# Patient Record
Sex: MALE | Race: OTHER | NOT HISPANIC OR LATINO | URBAN - METROPOLITAN AREA
[De-identification: names, ages, dates, MRNs, and addresses within clinical notes are randomized per-mention and may not be internally consistent; named-entity substitution may affect disease eponyms.]

---

## 2017-03-02 ENCOUNTER — OUTPATIENT (OUTPATIENT)
Dept: OUTPATIENT SERVICES | Facility: HOSPITAL | Age: 22
LOS: 1 days | Discharge: HOME | End: 2017-03-02

## 2017-06-27 DIAGNOSIS — R29.91 UNSPECIFIED SYMPTOMS AND SIGNS INVOLVING THE MUSCULOSKELETAL SYSTEM: ICD-10-CM

## 2019-03-09 ENCOUNTER — OFFICE VISIT (OUTPATIENT)
Dept: URGENT CARE | Facility: URGENT CARE | Age: 24
End: 2019-03-09
Payer: COMMERCIAL

## 2019-03-09 VITALS
OXYGEN SATURATION: 97 % | SYSTOLIC BLOOD PRESSURE: 120 MMHG | WEIGHT: 240 LBS | BODY MASS INDEX: 38.57 KG/M2 | HEIGHT: 66 IN | DIASTOLIC BLOOD PRESSURE: 82 MMHG | HEART RATE: 84 BPM

## 2019-03-09 DIAGNOSIS — J01.90 ACUTE SINUSITIS WITH SYMPTOMS > 10 DAYS: Primary | ICD-10-CM

## 2019-03-09 PROCEDURE — 99203 OFFICE O/P NEW LOW 30 MIN: CPT | Performed by: INTERNAL MEDICINE

## 2019-03-09 RX ORDER — MULTIVIT-MIN/IRON/FOLIC ACID/K 18-600-40
CAPSULE ORAL AT BEDTIME
COMMUNITY

## 2019-03-09 RX ORDER — ESCITALOPRAM OXALATE 10 MG/1
10 TABLET ORAL AT BEDTIME
COMMUNITY

## 2019-03-09 ASSESSMENT — MIFFLIN-ST. JEOR: SCORE: 2031.38

## 2019-03-09 ASSESSMENT — ENCOUNTER SYMPTOMS: FEVER: 0

## 2019-03-09 NOTE — PROGRESS NOTES
"SUBJECTIVE:   Amilcar Cope is a 23 year old male presenting with a chief complaint of   Chief Complaint   Patient presents with     Urgent Care     URI     sick x 3 weeks, headache, joint pain, yellow gunk. h/o immune compromized.        He is a new patient of [facility].    URI Adult    Onset of symptoms was 3 week(s) ago.  Course of illness is waxing and waning.      Current and Associated symptoms: green mucous from nose, sore throat, facial pain/pressure, headache and body aches  Treatment measures tried include OTC Cough med.  Predisposing factors include ill contact: roommate.        Review of Systems   Constitutional: Negative for fever.       Past Medical History:   Diagnosis Date     Anxiety      Asthma     child     Depression      Hypogammaglobulinemia (H)     sinus infections     Family History   Problem Relation Age of Onset     Immunodeficiency Father         as child     Current Outpatient Medications   Medication Sig Dispense Refill     amoxicillin-clavulanate (AUGMENTIN) 875-125 MG tablet Take 1 tablet by mouth 2 times daily for 10 days 20 tablet 0     Cholecalciferol (VITAMIN D) 2000 units CAPS        escitalopram (LEXAPRO) 10 MG tablet Take 10 mg by mouth daily       Testosterone Cypionate 50 MG/ML SOLN        Social History     Tobacco Use     Smoking status: Never Smoker     Smokeless tobacco: Never Used   Substance Use Topics     Alcohol use: Not on file       OBJECTIVE  /82   Pulse 84   Ht 1.676 m (5' 6\")   Wt 108.9 kg (240 lb)   SpO2 97%   BMI 38.74 kg/m      Physical Exam   Constitutional: He appears well-developed and well-nourished.   fatigue   HENT:   tm nl b  bilateral sinus tenderness  pnd   Eyes: Conjunctivae are normal.   Cardiovascular: Normal rate, regular rhythm, normal heart sounds and intact distal pulses.   Pulmonary/Chest: Effort normal and breath sounds normal.   Vitals reviewed.      Labs:  No results found for this or any previous visit (from the past 24 " hour(s)).        ASSESSMENT:      ICD-10-CM    1. Acute sinusitis with symptoms > 10 days J01.90 amoxicillin-clavulanate (AUGMENTIN) 875-125 MG tablet        Serious Comorbid Conditions:  Adult:  immunosuppression    PLAN:    URI Adult:  RX sinusitis  Augmentin 875 bid x 10 days    Followup:    If not improving or if condition worsens, follow up with your Primary Care Provider

## 2019-04-23 ENCOUNTER — PATIENT OUTREACH (OUTPATIENT)
Dept: PLASTIC SURGERY | Facility: CLINIC | Age: 24
End: 2019-04-23

## 2019-04-23 DIAGNOSIS — F64.0 GENDER DYSPHORIA IN ADOLESCENT AND ADULT: Primary | ICD-10-CM

## 2019-04-23 NOTE — PROGRESS NOTES
[facility]:  Care Coordination Note     SITUATION   Patient (Amilcar, He/him) is a 23 year old who is receiving support for:  Clinic Care Coordination - Initial (new patient referral for top & hysterectomy)  .    BACKGROUND     New patient referred by Dr. Rodas at [facility], requesting top surgery and hysterectomy.      ASSESSMENT     Surgery              [facility] Assessment  Comprehensive [facility] (CGC) Enrollment: Enrolled([facility] 8/2017 with Dr. Rodas at [facility])  Patient has a therapist: No(Starting with New Therapist soon)  Letter of support #1: Requested  Letter of support #2: Requested  Surgery being considered: Yes  Mastectomy: Yes          PLAN          Nursing Interventions:  [facility] program and services discussed with patient. CGC referral placed. CGC assessment completed. Process for accessing surgery discussed including: WPATH standards of care, Letters of support, [place] insurance process, surgery scheduling, and approximate timeline. Pt questions answered. Registration completed & reviewed; scheduling process discussed & completed, as necessary.     More than 50% of the time was used to educate patient on medical & surgical process.     Follow-up plan:  Pt to work with new therapist to obtain one letter of support. Pt informed about needing 2 Letters of support for hysterectomy. Pt scheduled for plastic surgery appt. IB Msg sent to [facility] to call pt to schedule for hysterectomy consultation.        Severo Reagan

## 2019-05-03 ENCOUNTER — HEALTH MAINTENANCE LETTER (OUTPATIENT)
Age: 24
End: 2019-05-03

## 2019-05-28 ASSESSMENT — ANXIETY QUESTIONNAIRES
5. BEING SO RESTLESS THAT IT IS HARD TO SIT STILL: MORE THAN HALF THE DAYS
GAD7 TOTAL SCORE: 11
4. TROUBLE RELAXING: SEVERAL DAYS
7. FEELING AFRAID AS IF SOMETHING AWFUL MIGHT HAPPEN: SEVERAL DAYS
3. WORRYING TOO MUCH ABOUT DIFFERENT THINGS: MORE THAN HALF THE DAYS
2. NOT BEING ABLE TO STOP OR CONTROL WORRYING: MORE THAN HALF THE DAYS
1. FEELING NERVOUS, ANXIOUS, OR ON EDGE: SEVERAL DAYS
6. BECOMING EASILY ANNOYED OR IRRITABLE: MORE THAN HALF THE DAYS
GAD7 TOTAL SCORE: 11
7. FEELING AFRAID AS IF SOMETHING AWFUL MIGHT HAPPEN: SEVERAL DAYS

## 2019-05-28 ASSESSMENT — ENCOUNTER SYMPTOMS
MYALGIAS: 1
NECK PAIN: 1
ARTHRALGIAS: 0
NAIL CHANGES: 0
PANIC: 1
DECREASED CONCENTRATION: 1
POOR WOUND HEALING: 0
NERVOUS/ANXIOUS: 1
DEPRESSION: 1
INSOMNIA: 1
SKIN CHANGES: 0
MUSCLE WEAKNESS: 0
BACK PAIN: 0
JOINT SWELLING: 0
STIFFNESS: 1
MUSCLE CRAMPS: 0

## 2019-05-29 ASSESSMENT — ANXIETY QUESTIONNAIRES: GAD7 TOTAL SCORE: 11

## 2019-05-30 ENCOUNTER — OFFICE VISIT (OUTPATIENT)
Dept: OBGYN | Facility: CLINIC | Age: 24
End: 2019-05-30
Attending: PLASTIC SURGERY
Payer: COMMERCIAL

## 2019-05-30 VITALS
SYSTOLIC BLOOD PRESSURE: 116 MMHG | HEIGHT: 66 IN | BODY MASS INDEX: 38.41 KG/M2 | HEART RATE: 55 BPM | WEIGHT: 239 LBS | DIASTOLIC BLOOD PRESSURE: 77 MMHG

## 2019-05-30 DIAGNOSIS — F64.0 GENDER DYSPHORIA IN ADULT: Primary | ICD-10-CM

## 2019-05-30 ASSESSMENT — PAIN SCALES - GENERAL: PAINLEVEL: NO PAIN (0)

## 2019-05-30 ASSESSMENT — MIFFLIN-ST. JEOR: SCORE: 2026.85

## 2019-05-30 NOTE — LETTER
5/30/2019       RE: Amilcar Cope  [address] [facility] [id number]     Dear Colleague,    Thank you for referring your patient, Amilcar Cope, to the [facility] at [facility]. Please see a copy of my visit note below.    Gynecology visit Note  5/30/19    Reason for visit: Desires hysterectomy    HPI: Wiley Cope is a 24 y/o female to male transgender patient who presents today for discussion of possible hysterectomy with BSO. He is currently taking testosterone injections since August 2018. He does have occasional spotting that has no predictable schedule and ~one day of bleeding. He has associated lower abdominal pain/cramping that has been debilitating in the past. He's had a Mirena IUD inserted since 2016 and this has helped his bleeding slightly. He has no interest in fertility sparing options. He has a history of hypogammaglobulinemia and in his childhood and had many infections and required long term antibiotics as a child. Since that time, he's had chronic sinus infections but they have cleared after starting Zyrtec.     OB/GYN History:  Nulligravid  Menses as per HPI  Sexually active with male partners  Denies STI history, declines STI screening  Pap smear UTD  Mirena IUD for contraception as well as menstrual suppression    PMH:  Past Medical History:   Diagnosis Date     Anxiety      Asthma     child     Depression      Hypogammaglobulinemia (H)     sinus infections     PSHx:  [place] Teeth    Medications:  Current Outpatient Medications   Medication     Cholecalciferol (VITAMIN D) 2000 units CAPS     escitalopram (LEXAPRO) 10 MG tablet     Testosterone Cypionate 50 MG/ML SOLN     Allergies   Allergen Reactions     Sulfa Drugs      Social History     Tobacco Use     Smoking status: Never Smoker     Smokeless tobacco: Never Used   Substance Use Topics     Alcohol use: Not Currently     Drug use: Never     Family History   Problem Relation  "Age of Onset     Immunodeficiency Father         as child     Coronary Artery Disease Father      Hypertension Father      Anxiety Disorder Father      Diabetes Father      Genetic Disorder Father         Autoimmune     Obesity Father      Asthma Father      Anesthesia Reaction Father         Issues waking up     Breast Cancer Paternal Grandfather      Coronary Artery Disease Paternal Grandfather      Hypertension Paternal Grandfather      Anxiety Disorder Paternal Grandfather      Asthma Paternal Grandfather      Uterine Cancer Paternal Grandmother      Other Cancer Paternal Grandmother      Anxiety Disorder Paternal Grandmother      Substance Abuse Paternal Grandmother      Other Cancer Maternal Grandfather      Coronary Artery Disease Maternal Grandfather      Hypertension Maternal Grandfather      Anxiety Disorder Maternal Grandfather      Substance Abuse Maternal Grandfather      Coronary Artery Disease Mother      Anxiety Disorder Mother      Obesity Mother      Asthma Mother         Acid reflux induced     Anesthesia Reaction Mother         Gets violently ill     Coronary Artery Disease Maternal Grandmother      Anxiety Disorder Maternal Grandmother      Diabetes Maternal Grandmother      Obesity Maternal Grandmother    No bleeding/clotting disorders    Physical Exam:   Vitals:    05/30/19 0819   BP: 116/77   Pulse: 55   Weight: 108.4 kg (239 lb)   Height: 1.676 m (5' 6\")      General: NAD, A&Ox3  Resp: Non-labored breathing    Imaging Pelvic US 12/7/2018:  Measurements and comments  Uterus:  Longitudinal AP Transverse   6.8x3.4x4.2 cm   Position:  anteverted  Comments:  symmetrical    Cervix and lower uterine segment are: Normal  Myometrium: homogeneous    Endometrium: 1.7 mm, IUD is seen in appropriate position in endometrial   cavity    Right ovary:   Longitudinal AP Transverse   2.2 1.5 1.3 cm   Comments: appears normal    Left ovary:  Longitudinal AP Transverse   1.4 2.0 2.8 cm   Comments: appears " normal    Adnexa:  no masses seen    Peritoneal fluid: absent    Assessment/Plan: Wiley Cope is a 22 y/o female to male transgender patient who presents today in consultation for a possible hysterectomy with BSO.    #Gender Confirmation Surgery:    - Surgical options including open, laparoscopic, and robotic assisted surgery all discussed. We discussed hysterectomy with or without removal of ovaries and fallopian tubes.  He would like to proceed with a total hysterectomy with BSO. Risks and benefits of hysterectomy with BSO discussed with patient including bleeding, infection, damage to surrounding structures including bladder, bowel, ureters, muscles, nerves, vessels.  We also discussed risk of regret.  He understands this is a sterilization procedure and signed the consent form today acknowledging this. He declines referral for consideration of egg harvesting pre-surgery.  He expresses no interest in fertility sparing options.  He is aware he needs 2 letters of support prior to the operation. We discussed expected recovery time, easier with minimally invasive surgery, but 6 weeks recovery either way with pelvic and lifting restrictions x 6 weeks.  We discussed the rare likelihood of malignancy given his age and recent ultrasound but that if final pathology showed any concerns for this, he may require further surgery and he understands this.  He is considering open vs robotic assisted and wants to speak with the surgical scheduler prior to deciding as timing of surgery seems to be a more priority than type of surgery.  Will need pre-operative visit within 30 days of his scheduled surgery date, he understands this.    [place], MS4    Pt seen and discussed with Dr. Cuellar.     Physician Attestation     I, Phylicia Cuellar, was present with the medical student who participated in the service and in the documentation of the note.  I have verified the history and personally performed the physical exam and medical  decision making.  I agree with the assessment and plan of care as documented in the note.       Phylicia Cuellar MD

## 2019-05-30 NOTE — NURSING NOTE
Chief Complaint   Patient presents with     Consult     Hysterectomy consult   Maribeth Mccall LPN

## 2019-05-30 NOTE — PROGRESS NOTES
Gynecology visit Note  5/30/19    Reason for visit: Desires hysterectomy    HPI: Wiley Cope is a 22 y/o female to male transgender patient who presents today for discussion of possible hysterectomy with BSO. He is currently taking testosterone injections since August 2018. He does have occasional spotting that has no predictable schedule and ~one day of bleeding. He has associated lower abdominal pain/cramping that has been debilitating in the past. He's had a Mirena IUD inserted since 2016 and this has helped his bleeding slightly. He has no interest in fertility sparing options. He has a history of hypogammaglobulinemia and in his childhood and had many infections and required long term antibiotics as a child. Since that time, he's had chronic sinus infections but they have cleared after starting Zyrtec.     OB/GYN History:  Nulligravid  Menses as per HPI  Sexually active with male partners  Denies STI history, declines STI screening  Pap smear UTD  Mirena IUD for contraception as well as menstrual suppression    PMH:  Past Medical History:   Diagnosis Date     Anxiety      Asthma     child     Depression      Hypogammaglobulinemia (H)     sinus infections     PSHx:  [place] Teeth    Medications:  Current Outpatient Medications   Medication     Cholecalciferol (VITAMIN D) 2000 units CAPS     escitalopram (LEXAPRO) 10 MG tablet     Testosterone Cypionate 50 MG/ML SOLN     Allergies   Allergen Reactions     Sulfa Drugs      Social History     Tobacco Use     Smoking status: Never Smoker     Smokeless tobacco: Never Used   Substance Use Topics     Alcohol use: Not Currently     Drug use: Never     Family History   Problem Relation Age of Onset     Immunodeficiency Father         as child     Coronary Artery Disease Father      Hypertension Father      Anxiety Disorder Father      Diabetes Father      Genetic Disorder Father         Autoimmune     Obesity Father      Asthma Father      Anesthesia Reaction Father         " Issues waking up     Breast Cancer Paternal Grandfather      Coronary Artery Disease Paternal Grandfather      Hypertension Paternal Grandfather      Anxiety Disorder Paternal Grandfather      Asthma Paternal Grandfather      Uterine Cancer Paternal Grandmother      Other Cancer Paternal Grandmother      Anxiety Disorder Paternal Grandmother      Substance Abuse Paternal Grandmother      Other Cancer Maternal Grandfather      Coronary Artery Disease Maternal Grandfather      Hypertension Maternal Grandfather      Anxiety Disorder Maternal Grandfather      Substance Abuse Maternal Grandfather      Coronary Artery Disease Mother      Anxiety Disorder Mother      Obesity Mother      Asthma Mother         Acid reflux induced     Anesthesia Reaction Mother         Gets violently ill     Coronary Artery Disease Maternal Grandmother      Anxiety Disorder Maternal Grandmother      Diabetes Maternal Grandmother      Obesity Maternal Grandmother    No bleeding/clotting disorders    Physical Exam:   Vitals:    05/30/19 0819   BP: 116/77   Pulse: 55   Weight: 108.4 kg (239 lb)   Height: 1.676 m (5' 6\")      General: NAD, A&Ox3  Resp: Non-labored breathing    Imaging Pelvic US 12/7/2018:  Measurements and comments  Uterus:  Longitudinal AP Transverse   6.8x3.4x4.2 cm   Position:  anteverted  Comments:  symmetrical    Cervix and lower uterine segment are: Normal  Myometrium: homogeneous    Endometrium: 1.7 mm, IUD is seen in appropriate position in endometrial   cavity    Right ovary:   Longitudinal AP Transverse   2.2 1.5 1.3 cm   Comments: appears normal    Left ovary:  Longitudinal AP Transverse   1.4 2.0 2.8 cm   Comments: appears normal    Adnexa:  no masses seen    Peritoneal fluid: absent    Assessment/Plan: Wiley Cope is a 22 y/o female to male transgender patient who presents today in consultation for a possible hysterectomy with BSO.    #Gender Confirmation Surgery:    - Surgical options including open, laparoscopic, " and robotic assisted surgery all discussed. We discussed hysterectomy with or without removal of ovaries and fallopian tubes.  He would like to proceed with a total hysterectomy with BSO. Risks and benefits of hysterectomy with BSO discussed with patient including bleeding, infection, damage to surrounding structures including bladder, bowel, ureters, muscles, nerves, vessels.  We also discussed risk of regret.  He understands this is a sterilization procedure and signed the consent form today acknowledging this. He declines referral for consideration of egg harvesting pre-surgery.  He expresses no interest in fertility sparing options.  He is aware he needs 2 letters of support prior to the operation. We discussed expected recovery time, easier with minimally invasive surgery, but 6 weeks recovery either way with pelvic and lifting restrictions x 6 weeks.  We discussed the rare likelihood of malignancy given his age and recent ultrasound but that if final pathology showed any concerns for this, he may require further surgery and he understands this.  He is considering open vs robotic assisted and wants to speak with the surgical scheduler prior to deciding as timing of surgery seems to be a more priority than type of surgery.  Will need pre-operative visit within 30 days of his scheduled surgery date, he understands this.    [place], MS4    Pt seen and discussed with Dr. Cuellar.     Physician Attestation     I, Phylicia Cuellar, was present with the medical student who participated in the service and in the documentation of the note.  I have verified the history and personally performed the physical exam and medical decision making.  I agree with the assessment and plan of care as documented in the note.       Phylicia Cuellar MD

## 2019-06-03 DIAGNOSIS — F64.9 GENDER DYSPHORIA: Primary | ICD-10-CM

## 2019-06-03 RX ORDER — CEFAZOLIN SODIUM 2 G/100ML
2 INJECTION, SOLUTION INTRAVENOUS
Status: CANCELLED | OUTPATIENT
Start: 2019-06-03

## 2019-06-03 RX ORDER — PHENAZOPYRIDINE HYDROCHLORIDE 100 MG/1
200 TABLET, FILM COATED ORAL ONCE
Status: CANCELLED | OUTPATIENT
Start: 2019-06-03 | End: 2019-06-03

## 2019-06-03 RX ORDER — CEFAZOLIN SODIUM 1 G/3ML
1 INJECTION, POWDER, FOR SOLUTION INTRAMUSCULAR; INTRAVENOUS SEE ADMIN INSTRUCTIONS
Status: CANCELLED | OUTPATIENT
Start: 2019-06-03

## 2019-09-10 ENCOUNTER — TRANSFERRED RECORDS (OUTPATIENT)
Dept: HEALTH INFORMATION MANAGEMENT | Facility: CLINIC | Age: 24
End: 2019-09-10

## 2019-09-26 ENCOUNTER — PREP FOR PROCEDURE (OUTPATIENT)
Dept: OBGYN | Facility: CLINIC | Age: 24
End: 2019-09-26

## 2019-09-26 ENCOUNTER — ANESTHESIA EVENT (OUTPATIENT)
Dept: SURGERY | Facility: CLINIC | Age: 24
End: 2019-09-26
Payer: COMMERCIAL

## 2019-09-27 ENCOUNTER — ANESTHESIA (OUTPATIENT)
Dept: SURGERY | Facility: CLINIC | Age: 24
End: 2019-09-27
Payer: COMMERCIAL

## 2019-09-27 ENCOUNTER — HOSPITAL ENCOUNTER (OUTPATIENT)
Facility: CLINIC | Age: 24
Discharge: HOME | End: 2019-09-27
Attending: OBSTETRICS & GYNECOLOGY | Admitting: OBSTETRICS & GYNECOLOGY
Payer: COMMERCIAL

## 2019-09-27 VITALS
TEMPERATURE: 98.2 F | DIASTOLIC BLOOD PRESSURE: 67 MMHG | RESPIRATION RATE: 15 BRPM | WEIGHT: 233.47 LBS | BODY MASS INDEX: 37.52 KG/M2 | OXYGEN SATURATION: 97 % | HEART RATE: 80 BPM | HEIGHT: 66 IN | SYSTOLIC BLOOD PRESSURE: 121 MMHG

## 2019-09-27 DIAGNOSIS — F64.9 GENDER DYSPHORIA: ICD-10-CM

## 2019-09-27 LAB
ABO + RH BLD: NORMAL
ABO + RH BLD: NORMAL
ALBUMIN SERPL-MCNC: 4.1 G/DL (ref 3.4–5)
ALP SERPL-CCNC: 71 U/L (ref 40–150)
ALT SERPL W P-5'-P-CCNC: 23 U/L (ref 0–70)
ANION GAP SERPL CALCULATED.3IONS-SCNC: 9 MMOL/L (ref 3–14)
AST SERPL W P-5'-P-CCNC: 21 U/L (ref 0–45)
B-HCG SERPL-ACNC: <1 IU/L
BILIRUB SERPL-MCNC: 0.8 MG/DL (ref 0.2–1.3)
BLD GP AB SCN SERPL QL: NORMAL
BLOOD BANK CMNT PATIENT-IMP: NORMAL
BUN SERPL-MCNC: 18 MG/DL (ref 7–30)
CALCIUM SERPL-MCNC: 9 MG/DL (ref 8.5–10.1)
CHLORIDE SERPL-SCNC: 103 MMOL/L (ref 94–109)
CO2 SERPL-SCNC: 25 MMOL/L (ref 20–32)
CREAT SERPL-MCNC: 1.11 MG/DL (ref 0.66–1.25)
ERYTHROCYTE [DISTWIDTH] IN BLOOD BY AUTOMATED COUNT: 12.6 % (ref 10–15)
GFR SERPL CREATININE-BSD FRML MDRD: >90 ML/MIN/{1.73_M2}
GLUCOSE SERPL-MCNC: 82 MG/DL (ref 70–99)
HCT VFR BLD AUTO: 53.2 % (ref 40–53)
HGB BLD-MCNC: 18.3 G/DL (ref 13.3–17.7)
MCH RBC QN AUTO: 31 PG (ref 26.5–33)
MCHC RBC AUTO-ENTMCNC: 34.4 G/DL (ref 31.5–36.5)
MCV RBC AUTO: 90 FL (ref 78–100)
PLATELET # BLD AUTO: 276 10E9/L (ref 150–450)
POTASSIUM SERPL-SCNC: 3.6 MMOL/L (ref 3.4–5.3)
PROT SERPL-MCNC: 7.4 G/DL (ref 6.8–8.8)
RBC # BLD AUTO: 5.9 10E12/L (ref 4.4–5.9)
SODIUM SERPL-SCNC: 137 MMOL/L (ref 133–144)
SPECIMEN EXP DATE BLD: NORMAL
WBC # BLD AUTO: 12.1 10E9/L (ref 4–11)

## 2019-09-27 PROCEDURE — 37000008 ZZH ANESTHESIA TECHNICAL FEE, 1ST 30 MIN: Performed by: OBSTETRICS & GYNECOLOGY

## 2019-09-27 PROCEDURE — 25000132 ZZH RX MED GY IP 250 OP 250 PS 637: Performed by: OBSTETRICS & GYNECOLOGY

## 2019-09-27 PROCEDURE — 25800030 ZZH RX IP 258 OP 636: Performed by: NURSE ANESTHETIST, CERTIFIED REGISTERED

## 2019-09-27 PROCEDURE — 85027 COMPLETE CBC AUTOMATED: CPT | Performed by: OBSTETRICS & GYNECOLOGY

## 2019-09-27 PROCEDURE — 37000009 ZZH ANESTHESIA TECHNICAL FEE, EACH ADDTL 15 MIN: Performed by: OBSTETRICS & GYNECOLOGY

## 2019-09-27 PROCEDURE — 80053 COMPREHEN METABOLIC PANEL: CPT | Performed by: OBSTETRICS & GYNECOLOGY

## 2019-09-27 PROCEDURE — C1765 ADHESION BARRIER: HCPCS | Performed by: OBSTETRICS & GYNECOLOGY

## 2019-09-27 PROCEDURE — 86850 RBC ANTIBODY SCREEN: CPT | Performed by: OBSTETRICS & GYNECOLOGY

## 2019-09-27 PROCEDURE — 36000086 ZZH SURGERY LEVEL 8 1ST 30 MIN UMMC: Performed by: OBSTETRICS & GYNECOLOGY

## 2019-09-27 PROCEDURE — 88307 TISSUE EXAM BY PATHOLOGIST: CPT | Performed by: OBSTETRICS & GYNECOLOGY

## 2019-09-27 PROCEDURE — 25000128 H RX IP 250 OP 636

## 2019-09-27 PROCEDURE — 88307 TISSUE EXAM BY PATHOLOGIST: CPT | Mod: 26 | Performed by: OBSTETRICS & GYNECOLOGY

## 2019-09-27 PROCEDURE — 86901 BLOOD TYPING SEROLOGIC RH(D): CPT | Performed by: OBSTETRICS & GYNECOLOGY

## 2019-09-27 PROCEDURE — 25000125 ZZHC RX 250: Performed by: NURSE ANESTHETIST, CERTIFIED REGISTERED

## 2019-09-27 PROCEDURE — 27210794 ZZH OR GENERAL SUPPLY STERILE: Performed by: OBSTETRICS & GYNECOLOGY

## 2019-09-27 PROCEDURE — 25000128 H RX IP 250 OP 636: Performed by: NURSE ANESTHETIST, CERTIFIED REGISTERED

## 2019-09-27 PROCEDURE — 36000088 ZZH SURGERY LEVEL 8 EA 15 ADDTL MIN - UMMC: Performed by: OBSTETRICS & GYNECOLOGY

## 2019-09-27 PROCEDURE — 71000027 ZZH RECOVERY PHASE 2 EACH 15 MINS: Performed by: OBSTETRICS & GYNECOLOGY

## 2019-09-27 PROCEDURE — 71000014 ZZH RECOVERY PHASE 1 LEVEL 2 FIRST HR: Performed by: OBSTETRICS & GYNECOLOGY

## 2019-09-27 PROCEDURE — 84702 CHORIONIC GONADOTROPIN TEST: CPT | Performed by: OBSTETRICS & GYNECOLOGY

## 2019-09-27 PROCEDURE — 86900 BLOOD TYPING SEROLOGIC ABO: CPT | Performed by: OBSTETRICS & GYNECOLOGY

## 2019-09-27 PROCEDURE — 36415 COLL VENOUS BLD VENIPUNCTURE: CPT | Performed by: OBSTETRICS & GYNECOLOGY

## 2019-09-27 PROCEDURE — 40000170 ZZH STATISTIC PRE-PROCEDURE ASSESSMENT II: Performed by: OBSTETRICS & GYNECOLOGY

## 2019-09-27 PROCEDURE — 71000015 ZZH RECOVERY PHASE 1 LEVEL 2 EA ADDTL HR: Performed by: OBSTETRICS & GYNECOLOGY

## 2019-09-27 PROCEDURE — 25000131 ZZH RX MED GY IP 250 OP 636 PS 637: Performed by: STUDENT IN AN ORGANIZED HEALTH CARE EDUCATION/TRAINING PROGRAM

## 2019-09-27 PROCEDURE — 25800030 ZZH RX IP 258 OP 636

## 2019-09-27 PROCEDURE — 25000566 ZZH SEVOFLURANE, EA 15 MIN: Performed by: OBSTETRICS & GYNECOLOGY

## 2019-09-27 PROCEDURE — 25000128 H RX IP 250 OP 636: Performed by: OBSTETRICS & GYNECOLOGY

## 2019-09-27 RX ORDER — HYDROMORPHONE HYDROCHLORIDE 1 MG/ML
.3-.5 INJECTION, SOLUTION INTRAMUSCULAR; INTRAVENOUS; SUBCUTANEOUS EVERY 5 MIN PRN
Status: DISCONTINUED | OUTPATIENT
Start: 2019-09-27 | End: 2019-09-27 | Stop reason: HOSPADM

## 2019-09-27 RX ORDER — ONDANSETRON 2 MG/ML
INJECTION INTRAMUSCULAR; INTRAVENOUS PRN
Status: DISCONTINUED | OUTPATIENT
Start: 2019-09-27 | End: 2019-09-27

## 2019-09-27 RX ORDER — SODIUM CHLORIDE, SODIUM LACTATE, POTASSIUM CHLORIDE, CALCIUM CHLORIDE 600; 310; 30; 20 MG/100ML; MG/100ML; MG/100ML; MG/100ML
INJECTION, SOLUTION INTRAVENOUS CONTINUOUS
Status: DISCONTINUED | OUTPATIENT
Start: 2019-09-27 | End: 2019-09-27 | Stop reason: HOSPADM

## 2019-09-27 RX ORDER — DIPHENHYDRAMINE HCL 50 MG
50 CAPSULE ORAL AT BEDTIME
COMMUNITY

## 2019-09-27 RX ORDER — GABAPENTIN 100 MG/1
300 CAPSULE ORAL ONCE
Status: DISCONTINUED | OUTPATIENT
Start: 2019-09-27 | End: 2019-09-27 | Stop reason: HOSPADM

## 2019-09-27 RX ORDER — LIDOCAINE 40 MG/G
CREAM TOPICAL
Status: DISCONTINUED | OUTPATIENT
Start: 2019-09-27 | End: 2019-09-27 | Stop reason: HOSPADM

## 2019-09-27 RX ORDER — DEXAMETHASONE SODIUM PHOSPHATE 4 MG/ML
INJECTION, SOLUTION INTRA-ARTICULAR; INTRALESIONAL; INTRAMUSCULAR; INTRAVENOUS; SOFT TISSUE PRN
Status: DISCONTINUED | OUTPATIENT
Start: 2019-09-27 | End: 2019-09-27

## 2019-09-27 RX ORDER — PHENAZOPYRIDINE HYDROCHLORIDE 200 MG/1
200 TABLET, FILM COATED ORAL ONCE
Status: COMPLETED | OUTPATIENT
Start: 2019-09-27 | End: 2019-09-27

## 2019-09-27 RX ORDER — ACETAMINOPHEN 325 MG/1
650 TABLET ORAL
Status: DISCONTINUED | OUTPATIENT
Start: 2019-09-27 | End: 2019-09-27 | Stop reason: HOSPADM

## 2019-09-27 RX ORDER — FENTANYL CITRATE 50 UG/ML
25-50 INJECTION, SOLUTION INTRAMUSCULAR; INTRAVENOUS
Status: DISCONTINUED | OUTPATIENT
Start: 2019-09-27 | End: 2019-09-27 | Stop reason: HOSPADM

## 2019-09-27 RX ORDER — CEFAZOLIN SODIUM 2 G/100ML
2 INJECTION, SOLUTION INTRAVENOUS
Status: COMPLETED | OUTPATIENT
Start: 2019-09-27 | End: 2019-09-27

## 2019-09-27 RX ORDER — PROPOFOL 10 MG/ML
INJECTION, EMULSION INTRAVENOUS PRN
Status: DISCONTINUED | OUTPATIENT
Start: 2019-09-27 | End: 2019-09-27

## 2019-09-27 RX ORDER — ONDANSETRON 4 MG/1
4 TABLET, ORALLY DISINTEGRATING ORAL
Status: COMPLETED | OUTPATIENT
Start: 2019-09-27 | End: 2019-09-27

## 2019-09-27 RX ORDER — ONDANSETRON 2 MG/ML
4 INJECTION INTRAMUSCULAR; INTRAVENOUS EVERY 30 MIN PRN
Status: DISCONTINUED | OUTPATIENT
Start: 2019-09-27 | End: 2019-09-27 | Stop reason: HOSPADM

## 2019-09-27 RX ORDER — NALOXONE HYDROCHLORIDE 0.4 MG/ML
.1-.4 INJECTION, SOLUTION INTRAMUSCULAR; INTRAVENOUS; SUBCUTANEOUS
Status: DISCONTINUED | OUTPATIENT
Start: 2019-09-27 | End: 2019-09-27 | Stop reason: HOSPADM

## 2019-09-27 RX ORDER — OXYCODONE HYDROCHLORIDE 5 MG/1
5-10 TABLET ORAL EVERY 4 HOURS PRN
Qty: 10 TABLET | Refills: 0 | Status: SHIPPED | OUTPATIENT
Start: 2019-09-27 | End: 2019-11-12

## 2019-09-27 RX ORDER — FENTANYL CITRATE 50 UG/ML
INJECTION, SOLUTION INTRAMUSCULAR; INTRAVENOUS PRN
Status: DISCONTINUED | OUTPATIENT
Start: 2019-09-27 | End: 2019-09-27

## 2019-09-27 RX ORDER — OXYCODONE HYDROCHLORIDE 5 MG/1
5 TABLET ORAL
Status: DISCONTINUED | OUTPATIENT
Start: 2019-09-27 | End: 2019-09-27 | Stop reason: HOSPADM

## 2019-09-27 RX ORDER — CEFAZOLIN SODIUM 1 G/3ML
1 INJECTION, POWDER, FOR SOLUTION INTRAMUSCULAR; INTRAVENOUS SEE ADMIN INSTRUCTIONS
Status: DISCONTINUED | OUTPATIENT
Start: 2019-09-27 | End: 2019-09-27 | Stop reason: HOSPADM

## 2019-09-27 RX ORDER — CETIRIZINE HYDROCHLORIDE 10 MG/1
10 TABLET ORAL AT BEDTIME
COMMUNITY

## 2019-09-27 RX ORDER — AMOXICILLIN 250 MG
1-2 CAPSULE ORAL 2 TIMES DAILY
Qty: 30 TABLET | Refills: 0 | Status: SHIPPED | OUTPATIENT
Start: 2019-09-27 | End: 2019-11-12

## 2019-09-27 RX ORDER — LIDOCAINE HYDROCHLORIDE 20 MG/ML
INJECTION, SOLUTION INFILTRATION; PERINEURAL PRN
Status: DISCONTINUED | OUTPATIENT
Start: 2019-09-27 | End: 2019-09-27

## 2019-09-27 RX ORDER — FLUMAZENIL 0.1 MG/ML
0.2 INJECTION, SOLUTION INTRAVENOUS
Status: DISCONTINUED | OUTPATIENT
Start: 2019-09-27 | End: 2019-09-27 | Stop reason: HOSPADM

## 2019-09-27 RX ORDER — ONDANSETRON 4 MG/1
4 TABLET, ORALLY DISINTEGRATING ORAL EVERY 30 MIN PRN
Status: DISCONTINUED | OUTPATIENT
Start: 2019-09-27 | End: 2019-09-27 | Stop reason: HOSPADM

## 2019-09-27 RX ORDER — ACETAMINOPHEN 325 MG/1
975 TABLET ORAL ONCE
Status: DISCONTINUED | OUTPATIENT
Start: 2019-09-27 | End: 2019-09-27 | Stop reason: HOSPADM

## 2019-09-27 RX ORDER — EPHEDRINE SULFATE 50 MG/ML
INJECTION, SOLUTION INTRAMUSCULAR; INTRAVENOUS; SUBCUTANEOUS PRN
Status: DISCONTINUED | OUTPATIENT
Start: 2019-09-27 | End: 2019-09-27

## 2019-09-27 RX ADMIN — HYDROMORPHONE HYDROCHLORIDE 0.4 MG: 1 INJECTION, SOLUTION INTRAMUSCULAR; INTRAVENOUS; SUBCUTANEOUS at 12:43

## 2019-09-27 RX ADMIN — FENTANYL CITRATE 50 MCG: 50 INJECTION, SOLUTION INTRAMUSCULAR; INTRAVENOUS at 09:11

## 2019-09-27 RX ADMIN — PHENAZOPYRIDINE HYDROCHLORIDE 200 MG: 200 TABLET, FILM COATED ORAL at 06:14

## 2019-09-27 RX ADMIN — FENTANYL CITRATE 25 MCG: 50 INJECTION INTRAMUSCULAR; INTRAVENOUS at 11:54

## 2019-09-27 RX ADMIN — ONDANSETRON 4 MG: 2 INJECTION INTRAMUSCULAR; INTRAVENOUS at 12:09

## 2019-09-27 RX ADMIN — LIDOCAINE HYDROCHLORIDE 60 MG: 20 INJECTION, SOLUTION INFILTRATION; PERINEURAL at 08:24

## 2019-09-27 RX ADMIN — Medication 5 MG: at 09:26

## 2019-09-27 RX ADMIN — FENTANYL CITRATE 100 MCG: 50 INJECTION, SOLUTION INTRAMUSCULAR; INTRAVENOUS at 08:24

## 2019-09-27 RX ADMIN — FENTANYL CITRATE 50 MCG: 50 INJECTION, SOLUTION INTRAMUSCULAR; INTRAVENOUS at 10:38

## 2019-09-27 RX ADMIN — CEFAZOLIN 1 G: 1 INJECTION, POWDER, FOR SOLUTION INTRAMUSCULAR; INTRAVENOUS at 10:27

## 2019-09-27 RX ADMIN — ONDANSETRON 4 MG: 2 INJECTION INTRAMUSCULAR; INTRAVENOUS at 10:58

## 2019-09-27 RX ADMIN — ONDANSETRON 4 MG: 4 TABLET, ORALLY DISINTEGRATING ORAL at 15:25

## 2019-09-27 RX ADMIN — PHENYLEPHRINE HYDROCHLORIDE 100 MCG: 10 INJECTION INTRAVENOUS at 09:04

## 2019-09-27 RX ADMIN — Medication 5 MG: at 09:05

## 2019-09-27 RX ADMIN — ROCURONIUM BROMIDE 10 MG: 10 INJECTION INTRAVENOUS at 10:00

## 2019-09-27 RX ADMIN — SODIUM CHLORIDE, POTASSIUM CHLORIDE, SODIUM LACTATE AND CALCIUM CHLORIDE: 600; 310; 30; 20 INJECTION, SOLUTION INTRAVENOUS at 08:09

## 2019-09-27 RX ADMIN — HYDROMORPHONE HYDROCHLORIDE 0.5 MG: 1 INJECTION, SOLUTION INTRAMUSCULAR; INTRAVENOUS; SUBCUTANEOUS at 13:00

## 2019-09-27 RX ADMIN — ROCURONIUM BROMIDE 10 MG: 10 INJECTION INTRAVENOUS at 09:44

## 2019-09-27 RX ADMIN — MIDAZOLAM 2 MG: 1 INJECTION INTRAMUSCULAR; INTRAVENOUS at 08:20

## 2019-09-27 RX ADMIN — PROPOFOL 150 MG: 10 INJECTION, EMULSION INTRAVENOUS at 08:25

## 2019-09-27 RX ADMIN — FENTANYL CITRATE 50 MCG: 50 INJECTION, SOLUTION INTRAMUSCULAR; INTRAVENOUS at 10:00

## 2019-09-27 RX ADMIN — SUGAMMADEX 200 MG: 100 INJECTION, SOLUTION INTRAVENOUS at 11:07

## 2019-09-27 RX ADMIN — CEFAZOLIN SODIUM 2 G: 2 INJECTION, SOLUTION INTRAVENOUS at 08:28

## 2019-09-27 RX ADMIN — SODIUM CHLORIDE, POTASSIUM CHLORIDE, SODIUM LACTATE AND CALCIUM CHLORIDE: 600; 310; 30; 20 INJECTION, SOLUTION INTRAVENOUS at 09:24

## 2019-09-27 RX ADMIN — ROCURONIUM BROMIDE 50 MG: 10 INJECTION INTRAVENOUS at 08:26

## 2019-09-27 RX ADMIN — ROCURONIUM BROMIDE 10 MG: 10 INJECTION INTRAVENOUS at 10:37

## 2019-09-27 RX ADMIN — DEXAMETHASONE SODIUM PHOSPHATE 8 MG: 4 INJECTION, SOLUTION INTRAMUSCULAR; INTRAVENOUS at 08:29

## 2019-09-27 RX ADMIN — ROCURONIUM BROMIDE 20 MG: 10 INJECTION INTRAVENOUS at 08:56

## 2019-09-27 RX ADMIN — HYDROMORPHONE HYDROCHLORIDE 0.3 MG: 1 INJECTION, SOLUTION INTRAMUSCULAR; INTRAVENOUS; SUBCUTANEOUS at 12:25

## 2019-09-27 RX ADMIN — FENTANYL CITRATE 50 MCG: 50 INJECTION INTRAMUSCULAR; INTRAVENOUS at 12:05

## 2019-09-27 ASSESSMENT — MIFFLIN-ST. JEOR: SCORE: 1996.75

## 2019-09-27 NOTE — OR NURSING
Pt feeling hot and a little nauseous. Gown changed to cloth, fan on for cooling. Pt back to sleep.

## 2019-09-27 NOTE — ANESTHESIA POSTPROCEDURE EVALUATION
Anesthesia POST Procedure Evaluation    Patient: Amilcar Cope   MRN:     [id number] Gender:   adult   Age:    24 year old :      1995        Preoperative Diagnosis: Gender Dysphoria   Procedure(s):  Davinci Assisted Total Laparoscopic Hysterectomy, Bilateral Salpingo Oophoretomy,   Postop Comments: No value filed.       Anesthesia Type:  Not documented  General    Reportable Event: NO     PAIN: Uncomplicated   Sign Out status: Comfortable, Well controlled pain     PONV: No PONV   Sign Out status:  No Nausea or Vomiting     Neuro/Psych: Uneventful perioperative course   Sign Out Status: Preoperative baseline; Age appropriate mentation     Airway/Resp.: Uneventful perioperative course   Sign Out Status: Non labored breathing, age appropriate RR; Resp. Status within EXPECTED Parameters     CV: Uneventful perioperative course   Sign Out status: Appropriate BP and perfusion indices; Appropriate HR/Rhythm     Disposition:   Sign Out in:  PACU  Disposition:  Phase II; Home  Recovery Course: Uneventful  Follow-Up: Not required           Last Anesthesia Record Vitals:  [facility] VITALS  2019 1052 - 2019 1152      2019             Resp Rate (observed):  14          Last PACU Vitals:  Vitals Value Taken Time   /90 2019  2:30 PM   Temp 36.8  C (98.2  F) 2019  2:30 PM   Pulse 80 2019  2:30 PM   Resp 14 2019  2:30 PM   SpO2 91 % 2019  2:34 PM   Temp src     NIBP     Pulse 90 2019 11:21 AM   SpO2 93 % 2019 11:21 AM   Resp     Temp     Ht Rate     Temp 2     Vitals shown include unvalidated device data.      Electronically Signed By: Aldo Luna MD, 2019, 3:16 PM

## 2019-09-27 NOTE — DISCHARGE INSTRUCTIONS
"Discharge Instructions:   Following a Laparoscopy    Comfort:    The amount of discomfort you can expect is very unpredictable.     If you have pain that cannot be controlled with non aspirin medication or with the prescription medication you may have received, you should notify your physician.     You May Experience:    Abdominal tenderness; abdominal cramps (like menstrual cramps).    Low back ache or discomfort radiating to your shoulders, chest, back or neck. This is a result of the gas used to inflate your abdomen during surgery. This gas is absorbed in 24 to 36 hours. The \"knee chest\" position will help relieve this discomfort.    Sore throat for a day or two resulting from the anesthesia tube used during surgery. You may use throat lozenges to help relieve this discomfort.    Black and blue marks on your abdomen.    Drainage:    You may expect a small amount of drainage from the incision on your abdomen and you may change the bandage when necessary.    You may also have a small amount of vaginal drainage for 3 to 4 days; this is normal and no cause for concern. If excessive bleeding occurs, notify your physician.    Do not douche, and use a pad rather than tampons. Do not resume intercourse for at least one week or until bleeding has ceased.    Home Activity:    The day of surgery spend a quiet day at home.    Increase activity as tolerated.    You may bathe or shower, do not soak in bath tub or scrub incisions.    You have no restrictions on your diet. Following surgery, drink plenty of fluids and eat a light meal.    The anesthesia may produce some nausea. If you feel nauseated, stay in bed, keep your head down and try drinking fluids such as Seven-Up, tea or soup.    Notify Physician at Once IF:    You have a fever over 100 degrees. A low grade fever (under 100 degrees) is usual after surgery.    You have severe pain.    You have a large amount of bleeding or drainage.    Rev. 4/2014  Discharge " Instructions:  Abdominal Hysterectomy   Healing takes time. How much time depends on your health and the type of surgery you had. During your recovery time, you can do a lot to make sure that you regain your health and energy.    Diet    Eat a well-balanced diet with lots of protein, fruits, vegetables, and whole grains.  Avoid spicy or greasy foods.     Drink plenty of fluids - at least 8 tall glasses of water a day. Water is best. Limit caffeine (coffee, tea, soda) to help prevent constipation (hard stools that are difficult to pass).    If you are constipated, you may take one of these medications from the drug store: Docusate (Colace), Docusate with Casanthranol (Adriana-Colace), Psyllium (Metamucil), or Milk of Magnesia. Follow to directions on the label.  Activity    Get plenty of rest at first. Slowly return to your normal routine. Several short walks each day will help. It is okay to climb stairs, but use the handrail in case you feel dizzy.     After 2 weeks, you may start gentle exercises. Listen to your body. If you feel tired, sore, or have backaches, you may be doing too much too soon.     Do not drive until you can step on the brakes without pain.     Do not use tampons, douche, or have sex (intercourse) until you see your doctor.     For the next 6 weeks, do not lift anything greater than 15 pounds and avoid heavy exercise.  Pain    Your pain should decrease over the next 2-3 weeks.     You may feel sore after mild exercise.    Take your pain medication as prescribed by your doctor.   Caring for your Incisions (if applicable)    You may see some fluid draining from your incision(s). Wear the bandage(s) until it stops.     Keep the area clean and dry. Wash with soap and water.    Do not use lotions or powders near the incision(s).    If you have:  o Steri-strips (small pieces of tape) - they should fall off on their own in 7-10 days. If that time has passed and they are still in place, you may remove  them.  o   o Dermabond (medical glue) - Leave it in place until it wears off.   Bathing  Take care to avoid slips and falls. Gently pat your incisions dry after bathing.    After Abdominal Hysterectomy (surgery through the belly): You may shower. Avoid tub baths and swimming for two weeks, of until your incision heals.       What to expect after surgery    A small amount of blood or fluid coming from your vagina for several weeks. Wear pads as needed.     Stitches poking out of the vagina.     Stitches passing out of the vagina (they will look like tiny threads).    Most stitches dissolve within 3 months.     Feeling numb around your stitches. This should go away in less than a year.     Feeling dizzy or light-headed. Hot flashes, trouble sleeping, sudden mood swings, and irritability. If side effects become a problem, notify your doctor.     If you had a vaginal repair, you may feel tugging in the vagina. This is a normal part of healing.   Call your doctor if you have:    Severe chills and a fever of 100.4 degrees F or higher, taken under the tongue.     Bright red blood coming out of the vagina - enough to soak one pad an hour.     Large clots coming out of the vagina.     Urine or vaginal fluid that smells bad.     Trouble urinating (peeing), burning when you go, or the need to go more often.     Calf pain and/or swelling in both legs.    Nausea (feeling sick to your stomach) or vomiting (throwing up).    Pain that you cannot control with the pain medication prescribed by your doctor.   Follow up with your doctor and return to the clinic in 6 weeks  Make this appointment after you get home if it has not already been scheduled.                 REV. 5/12

## 2019-09-27 NOTE — ANESTHESIA POSTPROCEDURE EVALUATION
Anesthesia POST Procedure Evaluation    Patient: Amilcar Cope   MRN:     [id number] Gender:   adult   Age:    24 year old :      1995        Preoperative Diagnosis: Gender Dysphoria   Procedure(s):  Davinci Assisted Total Laparoscopic Hysterectomy, Bilateral Salpingo Oophoretomy,   Postop Comments: No value filed.       Anesthesia Type:  Not documented  General    Reportable Event: NO     PAIN: Uncomplicated   Sign Out status: Comfortable, Well controlled pain     PONV: No PONV   Sign Out status:  No Nausea or Vomiting     Neuro/Psych: Uneventful perioperative course   Sign Out Status: Preoperative baseline; Age appropriate mentation     Airway/Resp.: Uneventful perioperative course   Sign Out Status: Non labored breathing, age appropriate RR; Resp. Status within EXPECTED Parameters     CV: Uneventful perioperative course   Sign Out status: Appropriate BP and perfusion indices; Appropriate HR/Rhythm     Disposition:   Sign Out in:  PACU  Disposition:  Phase II; Home  Recovery Course: Uneventful  Follow-Up: Not required     Comments/Narrative:  No nausea. Adequate analgesia. Resting comfortably. Okay for discharge from PACU.           Last Anesthesia Record Vitals:  CRNA VITALS  2019 1052 - 2019 1152      2019             Resp Rate (observed):  14          Last PACU Vitals:  Vitals Value Taken Time   /90 2019  2:30 PM   Temp 36.8  C (98.2  F) 2019  2:00 PM   Pulse 80 2019  2:30 PM   Resp 16 2019  2:15 PM   SpO2 95 % 2019  2:43 PM   Temp src     NIBP     Pulse 90 2019 11:21 AM   SpO2 93 % 2019 11:21 AM   Resp     Temp     Ht Rate     Temp 2     Vitals shown include unvalidated device data.      Electronically Signed By: Cas Conner MD, 2019, 2:45 PM

## 2019-09-27 NOTE — BRIEF OP NOTE
[facility], [place]    Brief Operative Note    Pre-operative diagnosis:   -Gender Dysphoria  Post-operative diagnosis   -Same, s/p procedure below    Procedure(s):  Davinci Assisted Total Laparoscopic Hysterectomy, Bilateral Salpingo Oophoretomy,    Surgeon(s) and Role:     * Emiliana Pillai MD - Primary     * Joi Horvath MD - Resident - Assisting      Anesthesia: Combined General with Block   IVF: 1200mL  Estimated blood loss: 25mL  UOP: 1300mL  Drains: None    Specimens:   ID Type Source Tests Collected by Time Destination   A : Uterus, Cervix, Bilateral Fallopian tubes and Ovaries for [employer] Tissue Uterus with Bilateral Ovaries and Fallopian Tubes OR DOCUMENTATION ONLY, SURGICAL PATHOLOGY EXAM Emiliana Pillai MD 9/27/2019 10:07 AM      Findings:   On EUA, slight clitoromegaly otherwise normal external genitalia, normal vagina, cervix. Small anteverted uterus, no palpable adnexal masses. Mirena IUD removed, intact. On laparoscopy, no injury to underlying viscera on entry. Normal bowel, omentum, peritoneum. Normal uterus, bilateral fallopian tubes, and ovaries. Physiologic left sided adhesions.     Complications: None apparent.  Implants:  Mirena IUD removed.     Joi Horvath MD, MHS  Ob/Gyn PGY4

## 2019-09-27 NOTE — ANESTHESIA PROCEDURE NOTES
Peripheral Nerve Block Procedure Note    Staff:     Anesthesiologist:  Aldo Luna MD  Location: PACU  Procedure Start/Stop TImes:      9/27/2019 12:00 PM     9/27/2019 3:12 PM    patient identified, IV checked, site marked, risks and benefits discussed, informed consent, monitors and equipment checked, pre-op evaluation, at physician/surgeon's request and post-op pain management      Correct Patient: Yes      Correct Position: Yes      Correct Site: Yes      Correct Procedure: Yes      Correct Laterality:  Yes    Site Marked:  Yes  Procedure details:     Procedure:  TAP    ASA:  1    Laterality:  Bilateral    Position:  Supine    Sterile Prep: chloraprep      Needle:  Insulated    Needle gauge:  21    Needle length (inches):  4    Ultrasound: Yes      Ultrasound used to identify targeted nerve, plexus, or vascular structure and placed a needle adjacent to it      Permanent Image entered into patiient's record      Abnormal pain on injection: No      Blood Aspirated: No      Paresthesias:  No    Bleeding at site: No      Test dose negative for signs of intravascular injection: No      Bolus via:  Needle    Infusion Method:  Single Shot    Blood aspirated via catheter: No

## 2019-09-27 NOTE — OP NOTE
"Operative Report  Patient: Amilcar Cope  MRN: [id number]  Date of surgery: 9/27/2019      Preop Dx:   - Gender Dysphoria  Postop Dx:   - same as above, s/p below listed procedures    Procedure:   Davinci Assisted Total Laparoscopic Hysterectomy, Bilateral Salpingo Oophoretomy    Surgeon:   Emiliana Pillai MD  Assistants:  Joi Horvath MD, S, PGY4    EBL:   25 cc   IVF:   1200 cc LR  Urine:   1300 cc clear urine at the end of the procedure     Complications:   None apparent     Findings:     On EUA, slight clitoromegaly otherwise normal external genitalia, normal vagina, cervix. Small anteverted uterus, no palpable adnexal masses. Mirena IUD removed, intact. On laparoscopy, no injury to underlying viscera on entry. Normal bowel, omentum, peritoneum. Bilateral ureters seen vermiculating transperitoneally. Normal uterus, bilateral fallopian tubes, and ovaries. Physiologic left sided adhesions.     Indications:   Amilcar Cope is a 24 year old male desiring gender confirmation surgery. He was seen in consultation for this and the above procedure was discussed. The risks, benefits and alternatives of surgery were discussed in detail with the patient and he agreed to proceed.  Informed consent was signed prior to the procedure.    Procedure:  The patient was taken to the operating room where he was prepped and draped in the usual sterile fashion. GETA was obtained and found to be adequate. 2g of Ancef was administered. He was positioned to the dorsal lithotomy position with yellow-fin stirrups. A \"time-out\" occurred.  A sterile open sided speculum was placed in the patient's vagina and the cervix visualized. The Mirena IUD was removed intact.  A single toothed tenaculum was placed on the anterior lip of the cervix and used for traction. The cervix was dilated and sounded to 6 cm and the modified KOH manipulator/Advincula Arch was placed and attached to the uterine positioning system. A Brito catheter was placed in " the  bladder.     An 8mm incision was made at the superior aspect of the umbilicus. Through this a Veress needle was inserted into the abdominal cavity and CO2 started.  The opening pressure was initially noted to be elevated. The Veress was readjusted and opening pressure then noted to be 6 mm of mercury. The abdomen was filled to a pressure of 15 mm Hg. The Veress needle was withdrawn. An 8mm trocar  was placed and the laparoscope inserted, confirming intrabdominal placement.  Three other ports were then placed: an 8 mm in the right mid abdomen, a 5 mm assistant port in the left upper quadrant , and an 8 mm trocars in the left mid abdomen.  All trocars were inserted under direct visualization without incident.      Attention was turned to the hysterectomy. The right round ligament was grasped and cauterized. The right retroperitoneal space was opened and the right IP ligament isolated.  The right ureter was noted to be vermiculating clear of the planned area of transection of the IP ligament.  The IP ligament was clamped, cauterized and transected.  Serial bites were then taken inferior and lateral to the right fallopian tube and ovary until the uterine cornua was reached. The bladder flap was then created and the bladder dissected off of the cervix.   Serial bites were then taken down the right side of the uterus serially clamping, cauterizing and cutting with the bipolar cautery.  The uterine artery was then clamped and cauterized x 2 prior to cutting with the laparoscopic cautery.  Hemostasis was obtained.  Several bites were then taken lateral to the cervix, medial to the uterine artery, thus lateralizing the uterine artery off of the cervix.     Attention was then turned to the left side. Similarly, the left round ligament was grasped and cauterized.  The retroperitoneal space was entered. The left ureter was observed to be vermiculating transperitoneally. The left IP ligament and adnexa were then taken down  in a similar fashion. The bipolar cautery device was used to clamp, cauterize and transect the round ligament and the bladder flap created from the left side.  Serial bites were then taken down the left side of the uterus serially clamping, cauterizing and cutting with the bipolar cautery.  The uterine artery was then clamped and cauterized x 2 prior to cutting with the laparoscopic cautery.  Hemostasis was obtained.  A bite  was then taken parallel to the cervix to lateralize the left uterine artery.  The bladder flap on the left was taken down with blunt dissection and with the cautery and noted to be clear of the area of planned amputation.  The monopolar scissors were used to create a colpotomy, guided by the uterine manipulator cup.  The entire specimen was removed intact though the vagina and the vaginal balloon inflated.  The vaginal cuff was closed with a running V-lock sutures. Hemostasis was obtained.  The abdomen was irrigated and hemostasis was appreciated at all surgical pedicles.    Bilateral ureters were again visualized and noted to be vermiculating.  All instruments were removed from the abdomen, cervix and vagina.  The skin was closed with 4-0 Monocryl and Exofin skin glue.     There were no complications of the procedure.  The patient went to the postanesthesia recovery room in stable condition.  Dr. Pillai was present for the entire procedure.    Joi Horvath MD, S  Ob/Gyn PGY4  09/27/19    I was present and scrubbed throughout the procedure,  I agree with the note above  Emiliana Pillai MD

## 2019-09-27 NOTE — ANESTHESIA CARE TRANSFER NOTE
Patient: Amilcar Cope    Procedure(s):  Davinci Assisted Total Laparoscopic Hysterectomy, Bilateral Salpingo Oophoretomy,    Diagnosis: Gender Dysphoria  Diagnosis Additional Information: No value filed.    Anesthesia Type:   General     Note:  Airway :Face Mask  Patient transferred to:PACU  Handoff Report: Identifed the Patient, Identified the Reponsible Provider, Reviewed the pertinent medical history, Discussed the surgical course, Reviewed Intra-OP anesthesia mangement and issues during anesthesia, Set expectations for post-procedure period and Allowed opportunity for questions and acknowledgement of understanding      Vitals: (Last set prior to Anesthesia Care Transfer)    CRNA VITALS  9/27/2019 1052 - 9/27/2019 1126      9/27/2019             Resp Rate (observed):  (!) 1                Electronically Signed By: TIN Khan CRNA  September 27, 2019  11:26 AM

## 2019-09-27 NOTE — ANESTHESIA PREPROCEDURE EVALUATION
Anesthesia Pre-Procedure Evaluation    Patient: Amilcar Cope   MRN:     [id number] Gender:   adult   Age:    24 year old :      1995        Preoperative Diagnosis: Gender Dysphoria   Procedure(s):  Davinci Assisted Total Laparoscopic Hysterectomy, Bilateral Salpingo Oophoretomy,  Possible Cystoscopy     Past Medical History:   Diagnosis Date     Anxiety      Asthma     child     Depression      Hypogammaglobulinemia (H)     sinus infections     PONV (postoperative nausea and vomiting)      [place] teeth extracted       History reviewed. No pertinent surgical history.       Anesthesia Evaluation     . Pt has had prior anesthetic. Type: MAC    History of anesthetic complications   - PONV        ROS/MED HX    ENT/Pulmonary:  - neg pulmonary ROS    (-) asthma   Neurologic:  - neg neurologic ROS     Cardiovascular:  - neg cardiovascular ROS       METS/Exercise Tolerance:  >4 METS   Hematologic:         Musculoskeletal:  - neg musculoskeletal ROS       GI/Hepatic:  - neg GI/hepatic ROS       Renal/Genitourinary:  - ROS Renal section negative       Endo:  - neg endo ROS       Psychiatric:  - neg psychiatric ROS       Infectious Disease:  - neg infectious disease ROS       Malignancy:      - no malignancy   Other:    - neg other ROS                     PHYSICAL EXAM:   Mental Status/Neuro: A/A/O   Airway: Facies: Feasible  Mallampati: I  Mouth/Opening: Full  TM distance: > 6 cm  Neck ROM: Full   Respiratory: Auscultation: CTAB     Resp. Rate: Normal     Resp. Effort: Normal      CV: Rhythm: Regular  Rate: Age appropriate  Heart: Normal Sounds  Edema: None   Comments:      Dental: Normal Dentition                LABS:  CBC:   Lab Results   Component Value Date    WBC 12.1 (H) 2019    HGB 18.3 (H) 2019    HCT 53.2 (H) 2019     2019     BMP:   Lab Results   Component Value Date     2019    POTASSIUM 3.6 2019    CHLORIDE 103 2019    CO2 25 2019    BUN 18  "09/27/2019    CR 1.11 09/27/2019    GLC 82 09/27/2019     COAGS: No results found for: PTT, INR, FIBR  POC: No results found for: BGM, HCG, HCGS  OTHER:   Lab Results   Component Value Date    TOSHIA 9.0 09/27/2019    ALBUMIN 4.1 09/27/2019    PROTTOTAL 7.4 09/27/2019    ALT 23 09/27/2019    AST 21 09/27/2019    ALKPHOS 71 09/27/2019    BILITOTAL 0.8 09/27/2019        Preop Vitals    BP Readings from Last 3 Encounters:   09/27/19 (!) 136/100   05/30/19 116/77   03/09/19 120/82    Pulse Readings from Last 3 Encounters:   09/27/19 70   05/30/19 55   03/09/19 84      Resp Readings from Last 3 Encounters:   09/27/19 18    SpO2 Readings from Last 3 Encounters:   09/27/19 96%   03/09/19 97%      Temp Readings from Last 1 Encounters:   09/27/19 36.5  C (97.7  F) (Oral)    Ht Readings from Last 1 Encounters:   09/27/19 1.676 m (5' 6\")      Wt Readings from Last 1 Encounters:   09/27/19 105.9 kg (233 lb 7.5 oz)    Estimated body mass index is 37.68 kg/m  as calculated from the following:    Height as of this encounter: 1.676 m (5' 6\").    Weight as of this encounter: 105.9 kg (233 lb 7.5 oz).     LDA:  Peripheral IV 09/27/19 Right Hand (Active)   Site Assessment WDL 9/27/2019  6:31 AM   Line Status Saline locked 9/27/2019  6:31 AM   Phlebitis Scale 0-->no symptoms 9/27/2019  6:31 AM   Infiltration Scale 0 9/27/2019  6:31 AM   Infiltration Site Treatment Method  None 9/27/2019  6:31 AM   Extravasation? No 9/27/2019  6:31 AM   Dressing Intervention New dressing  9/27/2019  6:31 AM   Number of days: 0        Assessment:   ASA SCORE: 1    H&P: History and physical reviewed and following examination; no interval change.   Smoking Status:  Non-Smoker/Unknown   NPO Status: NPO Appropriate     Plan:   Anes. Type:  General   Pre-Medication: None   Induction:  IV (Standard)   Airway: ETT; Oral   Access/Monitoring: PIV   Maintenance: Balanced     Postop Plan:   Postop Pain: Opioids  Postop Sedation/Airway: Not planned     PONV Management: "   Adult Risk Factors:, H/o PONV or Motion Sickness, Non-Smoker, Postop Opioids     CONSENT: Direct conversation                         Aldo Luna MD

## 2019-10-02 ENCOUNTER — PRE VISIT (OUTPATIENT)
Dept: SURGERY | Facility: CLINIC | Age: 24
End: 2019-10-02

## 2019-10-02 NOTE — TELEPHONE ENCOUNTER
FUTURE VISIT INFORMATION      FUTURE VISIT INFORMATION:    Date: 10/22/19    Time: 9:00am    Location: [facility]  REFERRAL INFORMATION:    Referring provider:  Dr. Rodas    Referring providers clinic:  [facility]    Reason for visit/diagnosis  top consult    RECORDS REQUESTED FROM:       Clinic name Comments Records Status Imaging Status   [facility] Davinci Assisted Total Laparoscopic Hysterectomy, Bilateral Salpingo Oophoretomy, done 9/27/19 EPIC

## 2019-10-06 LAB — COPATH REPORT: NORMAL

## 2019-10-22 ENCOUNTER — PATIENT OUTREACH (OUTPATIENT)
Dept: PLASTIC SURGERY | Facility: CLINIC | Age: 24
End: 2019-10-22

## 2019-10-22 ENCOUNTER — OFFICE VISIT (OUTPATIENT)
Dept: PLASTIC SURGERY | Facility: CLINIC | Age: 24
End: 2019-10-22
Payer: COMMERCIAL

## 2019-10-22 VITALS — WEIGHT: 236.3 LBS | BODY MASS INDEX: 37.97 KG/M2 | HEIGHT: 66 IN

## 2019-10-22 DIAGNOSIS — F64.0 GENDER DYSPHORIA IN ADOLESCENT AND ADULT: Primary | ICD-10-CM

## 2019-10-22 RX ORDER — CONJUGATED ESTROGENS 0.62 MG/G
CREAM VAGINAL
Refills: 11 | COMMUNITY
Start: 2019-09-23 | End: 2020-07-28

## 2019-10-22 ASSESSMENT — PAIN SCALES - GENERAL: PAINLEVEL: NO PAIN (0)

## 2019-10-22 ASSESSMENT — MIFFLIN-ST. JEOR: SCORE: 2009.6

## 2019-10-22 NOTE — PROGRESS NOTES
[facility]:  Care Coordination Note     SITUATION   Patient (Amilcar, He/Him)  is a 24 year old who is receiving support for:  Consult For (Top Surgery - [place]) and Clinic Care Coordination - Face To Face  .    BACKGROUND     Pt attended [facility] consultation appointment with Dr. Pratt; pt was accompanied by his mother, Charito.    Pt was unable to bring letter of support to consult. Pt started to see Alesha Gardner at [facility] but stopped due to inability to pay for copay.    Pts father plans to retire in May 2020 and will lose insurance. Discussed MNSure. Likely to have surgery prior to this time.     Pt reported extensive family Hx of cancer. Pt encouraged to discuss with Dr. Pratt. Pt had hysterectomy 3 weeks ago which was covered under medical necessity and not transgender healthcare.     ASSESSMENT     Surgery              [facility] Assessment  Comprehensive Gender Care ([facility]) Enrollment: Enrolled  Patient has a therapist: Yes  Name of therapist: Alesha Gardner at [facility]  Letter of support #1: Requested  Surgery being considered: Yes  Mastectomy: Yes  Hysterectomy completed: Yes  Hysterectomy Date: 09/27/19          PLAN          Nursing Interventions:   [facility] program and services discussed with patient. Educational surgical packet provided and reviewed with patient. Process for accessing surgery discussed, including: WPATH standards of care, letters of support, treatment plan action steps, PA insurance process, surgery scheduling, and approximate timeline.     Pt has letter of support in chart, which is adequate for PA. DINESH signed by patient.  Pt questions answered within scope of practice.     Follow-up plan:  Need one letter of support. See Dr. Pratt's note for surgical plan and requirements due to pts family Hx of cancer.        Severo Reagan

## 2019-10-22 NOTE — NURSING NOTE
"Chief Complaint   Patient presents with     Consult     Pt here for consult for top surgery       Vitals:    10/22/19 0852   Weight: 107.2 kg (236 lb 4.8 oz)   Height: 1.676 m (5' 6\")       Body mass index is 38.14 kg/m .      JOSE CardozoT                    No vitals taken per provider  "

## 2019-10-22 NOTE — PROGRESS NOTES
"PLASTICS NEW TOP   HPI: This is a 24 year old biological female transitioning to male with a history of gender dysphoria and depression/anxeity who presents today with his mother for a consultation for top surgery. He prefers pronouns he/him and preferred name is Amilcar. He was referred by Dr. Rodas at [facility] and made his appointment 6-7 months ago. His therapist is Alesha Gardner at [facility], and Amilcar has seen her for 4 months. This person will be writing a letter of support for Amilcar. He has been on testosterone for 3 years, administered by Dr. Rodas at [facility]. He has been living his chosen gender identity/role for 3-4 years. 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 However, binding causes Wiley rib pain and aches. He did have a lump in his breast and had a mammogram 1.5 years ago. This lump was found to be dense tissue. Otherwise, no skin puckering, nipple drainage, or other breast problems. No history of breast ultrasound. He had a hysterectomy 3 weeks ago.     Medical Hx: Gender dysphoria. No history of asthma, diabetes mellitus, or GERD. No bleeding, clotting, healing or scarring problems. Depression and anxiety, both managed with medications from PMD. No PTSD.     Surgical Hx: Robotic assisted total laparoscopic hysterectomy w/ BSO - 9/27/2019. Third molar extractions.     Family Hx: No family history of ovarian cancer. Paternal grandfather and paternal aunt had breast cancer. Maternal great-grandmother and maternal great-aunt had breast cancer. Mother had \"suspicious tissue\" in left breast which was removed, unsure if cancerous or not. Father has hypertension and was recently diagnosed with DM. Paternal grandfather had hypertension. Maternal grandmother had CHF. Maternal grandfather had a congenital heart defect and had a valve replacement. Maternal great-grandmother had DM. Maternal grandfather's family has extensive history of CVA and MI.     Social Hx: Occupation: Graduated " "from the [employer] in Spring 2019 with a degree in geology. He is currently looking for a job (is not working at this time). Relationship status/family: Lives with his parents who are both supportive of Wiley's transition. Wiley is partnered, and his partner lives with Amilcar and his parents. Partner is starting a board game company. Smoking status: Non smoker. Alcohol use: None. Diet: Omnivore; eats a varied diet. Caffeine: none - he gets migraines from caffeine. Exercise: Does not exercise much - states he walks a lot but does not do much else. Sleep: Reports getting 8-9 hours of sleep each night.     Vitals: Ht 1.676 m (5' 6\")   Wt 107.2 kg (236 lb 4.8 oz)   BMI 38.14 kg/m    PE: General: Height: 5' 6\" Weight: 236 lbs 4.8 oz   Chest: Obese abdomen. Nice upper chest contour. Striae noted bilaterally. Male pattern hair growth on chest and abdomen. Mild acne along chest wall. Right breast is larger than the left by approximately 200-300 grams. Moderate lateral thoracic rolls, but minimal back rolls. Right IMF at least 1-2 cm lower than the left, and IMF's are roughly 4-5 cm low. Mild lipodystrophy noted in anterior axillary folds. Grade 3 ptosis. Lumpy, dense breast tissue. Ridge palpated in upper quadrant of both breasts, left greater than right. No lymphadenopathy or palpable masses. Photos taken with consent.    A&P: 24 year old biological female transitioning to male who is a good candidate for gender affirming top surgery with one of the following: bilateral simple mastectomy with possible nipple graft reconstruction vs. subcutaneous mastectomy with possible nipple graft reconstruction vs. breast reduction with possible nipple graft reconstruction. Most likely, he will need  bilateral simple mastectomy with possible nipple graft reconstruction, depending on intraoperative findings and the patient's desired outcome. At this time, Amilcar is interested in nipple grafts. He will need a pre-op mammogram " in addition to an H&P from his PCP. His letter of support still needs to be written and sent to us by his therapist.     Due to Amilcar's extensive and complex family history of breast cancer, I would like him to visit Dr. Ashley Copeland here at the [facility] who specializes in high-risk breast cancer patients. Based on her evaluation, I may want a breast cancer surgeon to accompany me during Amilcar's top surgery.    He also met with our Transgender Coordinator to discuss communications with staff and timeline. Any further discussion of risks and complications will be reviewed during the pre-op visit. Once we receive his therapist letter of support and mammogram results we will initiate the prior authorization process.      According to [place] Case Law and [facility] standards of care with an appropriate letter of support form a mental health provider top surgery/mastectomy is medically necessary for the treatment of gender dysphoria.     Total time = 30 minutes, over half of which spent discussing surgical options    This note was prepared on behalf of Jenn Pratt MD, by Flaquita Clarke, a trained medical scribe, based on my observations and the provider's statements to me.

## 2019-10-22 NOTE — LETTER
"10/22/2019       RE: Amilcar Cope  [address] [facility] [address]     Dear Colleague,    Thank you for referring your patient, Amilcar Cope, to the [facility] PLASTIC AND RECONSTRUCTIVE SURGERY at [facility] Please see a copy of my visit note below.    PLASTICS NEW TOP   HPI: This is a 24 year old biological female transitioning to male with a history of gender dysphoria and depression/anxeity who presents today with his mother for a consultation for top surgery. He prefers pronouns he/him and preferred name is Amilcar. He was referred by Dr. Rodas at [facility] and made his appointment 6-7 months ago. His therapist is Alesha Gardner at [facility], and Amilcar has seen her for 4 months. This person will be writing a letter of support for Amilcar. He has been on testosterone for 3 years, administered by Dr. Rodas at [facility]. He has been living his chosen gender identity/role for 3-4 years. 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 However, binding causes Wiley rib pain and aches. He did have a lump in his breast and had a mammogram 1.5 years ago. This lump was found to be dense tissue. Otherwise, no skin puckering, nipple drainage, or other breast problems. No history of breast ultrasound. He had a hysterectomy 3 weeks ago.     Medical Hx: Gender dysphoria. No history of asthma, diabetes mellitus, or GERD. No bleeding, clotting, healing or scarring problems. Depression and anxiety, both managed with medications from PMD. No PTSD.     Surgical Hx: Robotic assisted total laparoscopic hysterectomy w/ BSO - 9/27/2019. Third molar extractions.     Family Hx: No family history of ovarian cancer. Paternal grandfather and paternal aunt had breast cancer. Maternal great-grandmother and maternal great-aunt had breast cancer. Mother had \"suspicious tissue\" in left breast which was removed, unsure if cancerous or not. Father has hypertension and was recently " "diagnosed with DM. Paternal grandfather had hypertension. Maternal grandmother had CHF. Maternal grandfather had a congenital heart defect and had a valve replacement. Maternal great-grandmother had DM. Maternal grandfather's family has extensive history of CVA and MI.     Social Hx: Occupation: Graduated from the [email or web address] in Spring 2019 with a degree in geology. He is currently looking for a job (is not working at this time). Relationship status/family: Lives with his parents who are both supportive of Wiley's transition. Wiley is partnered, and his partner lives with Amilcar and his parents. Partner is starting a board game company. Smoking status: Non smoker. Alcohol use: None. Diet: Omnivore; eats a varied diet. Caffeine: none - he gets migraines from caffeine. Exercise: Does not exercise much - states he walks a lot but does not do much else. Sleep: Reports getting 8-9 hours of sleep each night.     Vitals: Ht 1.676 m (5' 6\")   Wt 107.2 kg (236 lb 4.8 oz)   BMI 38.14 kg/m     PE: General: Height: 5' 6\" Weight: 236 lbs 4.8 oz   Chest: Obese abdomen. Nice upper chest contour. Striae noted bilaterally. Male pattern hair growth on chest and abdomen. Mild acne along chest wall. Right breast is larger than the left by approximately 200-300 grams. Moderate lateral thoracic rolls, but minimal back rolls. Right IMF at least 1-2 cm lower than the left, and IMF's are roughly 4-5 cm low. Mild lipodystrophy noted in anterior axillary folds. Grade 3 ptosis. Lumpy, dense breast tissue. Ridge palpated in upper quadrant of both breasts, left greater than right. No lymphadenopathy or palpable masses. Photos taken with consent.    A&P: 24 year old biological female transitioning to male who is a good candidate for gender affirming top surgery with one of the following: bilateral simple mastectomy with possible nipple graft reconstruction vs. subcutaneous mastectomy with possible nipple graft reconstruction vs. breast " reduction with possible nipple graft reconstruction. Most likely, he will need  bilateral simple mastectomy with possible nipple graft reconstruction, depending on intraoperative findings and the patient's desired outcome. At this time, Amilcar is interested in nipple grafts. He will need a pre-op mammogram in addition to an H&P from his PCP. His letter of support still needs to be written and sent to us by his therapist.     Due to Amilcar's extensive and complex family history of breast cancer, I would like him to visit Dr. Ashley Copeland here at the [facility] who specializes in high-risk breast cancer patients. Based on her evaluation, I may want a breast cancer surgeon to accompany me during Amilcar's top surgery.    He also met with our Transgender Coordinator to discuss communications with staff and timeline. Any further discussion of risks and complications will be reviewed during the pre-op visit. Once we receive his therapist letter of support and mammogram results we will initiate the prior authorization process.      According to [place] Case Law and [facility] standards of care with an appropriate letter of support form a mental health provider top surgery/mastectomy is medically necessary for the treatment of gender dysphoria.     Total time = 30 minutes, over half of which spent discussing surgical options    This note was prepared on behalf of Jenn Pratt MD, by Flaquita Clarke, a trained medical scribe, based on my observations and the provider's statements to me.     Again, thank you for allowing me to participate in the care of your patient.      Sincerely,    Jenn Pratt MD

## 2019-11-02 ENCOUNTER — TELEPHONE (OUTPATIENT)
Dept: ONCOLOGY | Facility: CLINIC | Age: 24
End: 2019-11-02

## 2019-11-02 NOTE — TELEPHONE ENCOUNTER
I called Amilcar to schedule an appt with Yuli Awad as requested via [employer]. Amilcar was not available, so I left a voicemail requesting a return call. [employer] sent to Phylicia Roldan with status update.

## 2019-11-12 ENCOUNTER — OFFICE VISIT (OUTPATIENT)
Dept: OBGYN | Facility: CLINIC | Age: 24
End: 2019-11-12
Attending: OBSTETRICS & GYNECOLOGY
Payer: COMMERCIAL

## 2019-11-12 VITALS
DIASTOLIC BLOOD PRESSURE: 76 MMHG | HEART RATE: 109 BPM | BODY MASS INDEX: 38.41 KG/M2 | SYSTOLIC BLOOD PRESSURE: 121 MMHG | WEIGHT: 238 LBS

## 2019-11-12 DIAGNOSIS — Z90.710 HISTORY OF ROBOT-ASSISTED LAPAROSCOPIC HYSTERECTOMY: Primary | ICD-10-CM

## 2019-11-12 PROCEDURE — G0463 HOSPITAL OUTPT CLINIC VISIT: HCPCS | Mod: ZF

## 2019-11-12 ASSESSMENT — PAIN SCALES - GENERAL: PAINLEVEL: NO PAIN (0)

## 2019-11-12 NOTE — PROGRESS NOTES
POST-OP VISIT  SUBJECTIVE   Amilcar Cope is a 24 year old FTM (he/his) here for post-operative visit following Davinci assisted total laparoscopic hysterectomy and bilateral salpingo-oophorectomy. Has no concerns today. He reports feeling well, thinks he's healing well. Denies any incision/pelvic pain or genital bleeding. He is wondering when he may resume penetrative sexual activity. He's had non-penetrative sexual activity since surgery without any pain. Also wondering if he can restart premarin, which he uses for vaginal atrophy d/t testosterone supplementation.    Past Medical History  Past Medical History:   Diagnosis Date     Anxiety      Asthma     child     Depression      Hypogammaglobulinemia (H)     sinus infections     PONV (postoperative nausea and vomiting)      [place] teeth extracted        Medications  Current Outpatient Medications   Medication     cetirizine (ZYRTEC) 10 MG tablet     Cholecalciferol (VITAMIN D) 2000 units CAPS     diphenhydrAMINE (BENADRYL) 50 MG capsule     escitalopram (LEXAPRO) 10 MG tablet     melatonin 5 MG tablet     PREMARIN 0.625 MG/GM vaginal cream     SUMAtriptan Succinate (IMITREX PO)     Testosterone Cypionate 50 MG/ML SOLN     No current facility-administered medications for this visit.        Allergies  Allergies   Allergen Reactions     Sulfa Drugs Anaphylaxis     tachycardia       Review of Systems  10 point ROS of systems including Constitutional, Eyes, Respiratory, Cardiovascular, Gastroenterology, Genitourinary, Integumentary, Muscularskeletal were all negative except for pertinent positives noted in the HPI. Psychiatry (history of anxiety and depression) noted, unchanged.    OBJECTIVE   /76   Pulse 109   Wt 108 kg (238 lb)   LMP 06/27/2019 (Approximate)   Breastfeeding? No   BMI 38.41 kg/m    General:  Alert, no distress   Head:  Normocephalic, without obvious abnormality   Lungs:  Normal work of breathing, does not appear short of breath   Heart:   "RRR   Abdomen:  Soft, non-tender. Incisions healing well. Normal scarring noted.             Labs:   Hemoglobin   Date Value Ref Range Status   09/27/2019 18.3 (H) 13.3 - 17.7 g/dL Final   ]    Pathology:   Copath Report   Date Value Ref Range Status   09/27/2019   Final    Patient Name: SID ROBERTO  MR#: [id number]  Specimen #: [id number]  Collected: 9/27/2019  Received: 9/27/2019  Reported: 10/6/2019 14:01  Ordering Phy(s): ANTHONY ENCINAS    For improved result formatting, select 'View Enhanced Report Format' under   Linked Documents section.    SPECIMEN(S):  Uterus, cervix, bilateral fallopian tubes, and ovaries    FINAL DIAGNOSIS:  UTERUS, CERVIX, BILATERAL FALLOPIAN TUBES, AND OVARIES, HYSTERECTOMY WITH   BILATERAL SALPINGO-OOPHORECTOMY:  - Atrophic endometrium with areas of glandular paucity  - Myometrium with no significant histologic abnormality  - Cervix with inflammatory and reactive change  - Ovaries with multiple cystic follicles  - Fallopian tubes with no significant histologic abnormality  - Negative for malignancy    COMMENT:  Except for the cervical epithelium, the histologic features in this case   are in keeping with those described  following prolonger androgen administration in transgender individuals   (Int J Gynecol Pathol 2018 Sep 24, doi:  10.1097/PGP.3785438634297671. [Epub ahead of print]. PMID: 35217327    I have personally reviewed all specimens and/or slides, including the   listed special stains, and used them  with my medical judgement to determine or confirm the final diagnosis.    Electronically signed out by:    Suzette Anthony M.D., PhD, [facility]    CLINICAL HISTORY:  Gender dysphoria    GROSS:    The specimen is received fresh with proper patient identification labeled   \"uterus, cervix, bilateral fallopian  tubes and ovaries\".  The specimen consists of a 79.3 g total hysterectomy   bilateral salpingo-oophorectomy  containing a uterus with attached cervix (8.7 cm fundus " to ectocervix, 3.3   cm cornu to cornu and 2.7 cm  anterior to posterior), left and right ovaries (2.2 x 2.2 x 1.3 cm and 2.2   x 1.7 x 1.3 cm respectively) and  left and right fallopian tubes (7.3 cm in length by 0.3 cm in diameter and   6.5 cm in length by 0.3 cm in  diameter respectively).  The uterine serosa is pink- red and slightly   wrinkled.  The ectocervix is tan-white  and contains a 0.8 cm slitlike cervical os.  The entire paracervical and   parametrial resection margins are  inked black.  The 4.2 x 2.7 cm endometrial cavity is lined by a pink- red   finely granular endometrium with a  uniform 0.1 cm thickness.  No masses or polyps are identified.  The   myometrium is finely trabeculated and  ranges from 1.3-1.6 cm in thickness with no nodules identified.  The   bilateral ovaries containing a tan-  yellow smooth outer surface.  Sectioning of each reveals a tan- yellow   smooth cut surface with multiple  thin-walled, smooth lined, cortical cyst filled with clear fluid ranging   from 0.1-0.3 cm in greatest  dimension.  The bilateral fallopian tubes contain a pink- purple outer   surface with no paratubular cysts  identified.  Sectioning reveals a patent pinpoint lumen and an   unremarkable fimbriated end.  Representative  sections are submitted.    Summary of Sections:  A1 - anterior cervix  A2 - posterior cervix  A3-A4 - anterior endomyometrium full-thickness  A5-A6 - posterior endomyometrium full-thickness  A7 - left ovary  A8 - left fallopian tube  A9 - right ovary  A10 - right fallopian tube (Dictated by: Mc Rollins 9/30/2019 10:58 AM)    MICROSCOPIC:  Microscopic examination was performed.    The technical component of this testing was completed at the [facility], with the professional component performed   at the [facility], [address] [place] [phone]  ([phone])    CPT Codes:  A: 63853-WI5    COLLECTION SITE:  Client: [facility]  Location: UROR (B)       ]    ASSESSMENT   Amilcar Cope is a 24 year old FTM for postop visit s/p Davinci assisted total laparoscopic hysterectomy and bilateral salpingo-oophorectomy on 9/27/19. Appears to be healing well. No postop pain, bleeding, or other concerning symptoms.    PLAN   1. Reviewed pathology, continued expectations for recovery  2. OK to resume lifting and physical activity, wait 2 weeks (until 11/22) for penetrative sexual activity.  3. Return to clinic with any concerns    Written by Aida Santoro, MS3, acting as scribe for Dr. Pillai.    I agree with the PFSH and ROS as completed by the medical student, except for changes made by me.  The remainder of the encounter was performed by me and scribed by the medical student.  The scribed note accurately reflects my personal services and the decisions made by me.  Emiliana Pillai MD

## 2019-11-12 NOTE — NURSING NOTE
Chief Complaint   Patient presents with     Surgical Followup     Total Hysterectomy 9/27/2019   Maribeth Mccall LPN

## 2019-11-12 NOTE — LETTER
11/12/2019       RE: Amilcar Cope  [address] [facility] [address]     Dear Colleague,    Thank you for referring your patient, Amilcar Cope, to the [facility] at [facility]. Please see a copy of my visit note below.    POST-OP VISIT  SUBJECTIVE   Amilcar Coep is a 24 year old FTM (he/his) here for post-operative visit following Davinci assisted total laparoscopic hysterectomy and bilateral salpingo-oophorectomy. Has no concerns today. He reports feeling well, thinks he's healing well. Denies any incision/pelvic pain or genital bleeding. He is wondering when he may resume penetrative sexual activity. He's had non-penetrative sexual activity since surgery without any pain. Also wondering if he can restart premarin, which he uses for vaginal atrophy d/t testosterone supplementation.    Past Medical History  Past Medical History:   Diagnosis Date     Anxiety      Asthma     child     Depression      Hypogammaglobulinemia (H)     sinus infections     PONV (postoperative nausea and vomiting)      [place] teeth extracted        Medications  Current Outpatient Medications   Medication     cetirizine (ZYRTEC) 10 MG tablet     Cholecalciferol (VITAMIN D) 2000 units CAPS     diphenhydrAMINE (BENADRYL) 50 MG capsule     escitalopram (LEXAPRO) 10 MG tablet     melatonin 5 MG tablet     PREMARIN 0.625 MG/GM vaginal cream     SUMAtriptan Succinate (IMITREX PO)     Testosterone Cypionate 50 MG/ML SOLN     No current facility-administered medications for this visit.        Allergies  Allergies   Allergen Reactions     Sulfa Drugs Anaphylaxis     tachycardia       Review of Systems  10 point ROS of systems including Constitutional, Eyes, Respiratory, Cardiovascular, Gastroenterology, Genitourinary, Integumentary, Muscularskeletal were all negative except for pertinent positives noted in the HPI. Psychiatry (history of anxiety and depression) noted,  unchanged.    OBJECTIVE   /76   Pulse 109   Wt 108 kg (238 lb)   LMP 06/27/2019 (Approximate)   Breastfeeding? No   BMI 38.41 kg/m     General:  Alert, no distress   Head:  Normocephalic, without obvious abnormality   Lungs:  Normal work of breathing, does not appear short of breath   Heart:  RRR   Abdomen:  Soft, non-tender. Incisions healing well. Normal scarring noted.             Labs:   Hemoglobin   Date Value Ref Range Status   09/27/2019 18.3 (H) 13.3 - 17.7 g/dL Final       Pathology:   Copath Report   Date Value Ref Range Status   09/27/2019   Final    Patient Name: SID ROBERTO  MR#: [id number]  Specimen #: [id number]  Collected: 9/27/2019  Received: 9/27/2019  Reported: 10/6/2019 14:01  Ordering Phy(s): ANTHONY ENCINAS    For improved result formatting, select 'View Enhanced Report Format' under   Linked Documents section.    SPECIMEN(S):  Uterus, cervix, bilateral fallopian tubes, and ovaries    FINAL DIAGNOSIS:  UTERUS, CERVIX, BILATERAL FALLOPIAN TUBES, AND OVARIES, HYSTERECTOMY WITH   BILATERAL SALPINGO-OOPHORECTOMY:  - Atrophic endometrium with areas of glandular paucity  - Myometrium with no significant histologic abnormality  - Cervix with inflammatory and reactive change  - Ovaries with multiple cystic follicles  - Fallopian tubes with no significant histologic abnormality  - Negative for malignancy    COMMENT:  Except for the cervical epithelium, the histologic features in this case   are in keeping with those described  following prolonger androgen administration in transgender individuals   (Int J Gynecol Pathol 2018 Sep 24, doi:  10.1097/PGP.[id number]. [Epub ahead of print]. PMID: 15215353    I have personally reviewed all specimens and/or slides, including the   listed special stains, and used them  with my medical judgement to determine or confirm the final diagnosis.    Electronically signed out by:    Suzette Anthony M.D., PhD, [facility]    CLINICAL HISTORY:  Gender  "dysphoria    GROSS:    The specimen is received fresh with proper patient identification labeled   \"uterus, cervix, bilateral fallopian  tubes and ovaries\".  The specimen consists of a 79.3 g total hysterectomy   bilateral salpingo-oophorectomy  containing a uterus with attached cervix (8.7 cm fundus to ectocervix, 3.3   cm cornu to cornu and 2.7 cm  anterior to posterior), left and right ovaries (2.2 x 2.2 x 1.3 cm and 2.2   x 1.7 x 1.3 cm respectively) and  left and right fallopian tubes (7.3 cm in length by 0.3 cm in diameter and   6.5 cm in length by 0.3 cm in  diameter respectively).  The uterine serosa is pink- red and slightly   wrinkled.  The ectocervix is tan-white  and contains a 0.8 cm slitlike cervical os.  The entire paracervical and   parametrial resection margins are  inked black.  The 4.2 x 2.7 cm endometrial cavity is lined by a pink- red   finely granular endometrium with a  uniform 0.1 cm thickness.  No masses or polyps are identified.  The   myometrium is finely trabeculated and  ranges from 1.3-1.6 cm in thickness with no nodules identified.  The   bilateral ovaries containing a tan-  yellow smooth outer surface.  Sectioning of each reveals a tan- yellow   smooth cut surface with multiple  thin-walled, smooth lined, cortical cyst filled with clear fluid ranging   from 0.1-0.3 cm in greatest  dimension.  The bilateral fallopian tubes contain a pink- purple outer   surface with no paratubular cysts  identified.  Sectioning reveals a patent pinpoint lumen and an   unremarkable fimbriated end.  Representative  sections are submitted.    Summary of Sections:  A1 - anterior cervix  A2 - posterior cervix  A3-A4 - anterior endomyometrium full-thickness  A5-A6 - posterior endomyometrium full-thickness  A7 - left ovary  A8 - left fallopian tube  A9 - right ovary  A10 - right fallopian tube (Dictated by: Mc Rollins 9/30/2019 10:58 AM)    MICROSCOPIC:  Microscopic examination was performed.    The " technical component of this testing was completed at the [facility], with the professional component performed   at the [facility], [address] [place] [address],   [place] [phone] ([phone])    CPT Codes:  A: 87185-XE5    COLLECTION SITE:  Client: [facility]  Location: UROR (B)           ASSESSMENT   Amilcar Cope is a 24 year old FTM for postop visit s/p Davinci assisted total laparoscopic hysterectomy and bilateral salpingo-oophorectomy on 9/27/19. Appears to be healing well. No postop pain, bleeding, or other concerning symptoms.    PLAN   1. Reviewed pathology, continued expectations for recovery  2. OK to resume lifting and physical activity, wait 2 weeks (until 11/22) for penetrative sexual activity.  3. Return to clinic with any concerns    Written by Aida Santoro, MS3, acting as scribe for Dr. Pillai.    I agree with the PFSH and ROS as completed by the medical student, except for changes made by me.  The remainder of the encounter was performed by me and scribed by the medical student.  The scribed note accurately reflects my personal services and the decisions made by me.    Emiliana Pillai MD

## 2019-11-19 ENCOUNTER — MEDICAL CORRESPONDENCE (OUTPATIENT)
Dept: HEALTH INFORMATION MANAGEMENT | Facility: CLINIC | Age: 24
End: 2019-11-19

## 2019-11-20 ENCOUNTER — TELEPHONE (OUTPATIENT)
Dept: OBGYN | Facility: CLINIC | Age: 24
End: 2019-11-20

## 2019-11-20 NOTE — TELEPHONE ENCOUNTER
Amilcar called to report new onset vaginal bleeding which began after chiropractor appt on Nov 18th. Pt states bleeding is mild in amount and pain is moderate. Pt states post-op pain and bleeding resolved approximately 3 weeks after surgery, but this new onset of symptoms seemed to coincide with his chiropractic adjustment. Amilcar is taking ibuprofen with moderate relief of pain. Nurse advised should be seen in clinic for further evaluation.    Pt agrees with plan. Scheduled with Dr. Velez tomorrow.

## 2019-11-21 ENCOUNTER — OFFICE VISIT (OUTPATIENT)
Dept: OBGYN | Facility: CLINIC | Age: 24
End: 2019-11-21
Attending: OBSTETRICS & GYNECOLOGY
Payer: COMMERCIAL

## 2019-11-21 ENCOUNTER — PATIENT OUTREACH (OUTPATIENT)
Dept: PLASTIC SURGERY | Facility: CLINIC | Age: 24
End: 2019-11-21

## 2019-11-21 ENCOUNTER — TELEPHONE (OUTPATIENT)
Dept: OBGYN | Facility: CLINIC | Age: 24
End: 2019-11-21

## 2019-11-21 VITALS
SYSTOLIC BLOOD PRESSURE: 120 MMHG | WEIGHT: 238 LBS | BODY MASS INDEX: 38.41 KG/M2 | DIASTOLIC BLOOD PRESSURE: 78 MMHG | HEART RATE: 73 BPM

## 2019-11-21 DIAGNOSIS — R30.0 DYSURIA: Primary | ICD-10-CM

## 2019-11-21 DIAGNOSIS — N30.00 ACUTE CYSTITIS WITHOUT HEMATURIA: Primary | ICD-10-CM

## 2019-11-21 LAB
ALBUMIN UR-MCNC: NEGATIVE MG/DL
APPEARANCE UR: CLEAR
BACTERIA #/AREA URNS HPF: ABNORMAL /HPF
BILIRUB UR QL STRIP: NEGATIVE
COLOR UR AUTO: YELLOW
GLUCOSE UR STRIP-MCNC: NEGATIVE MG/DL
HGB UR QL STRIP: NEGATIVE
KETONES UR STRIP-MCNC: NEGATIVE MG/DL
LEUKOCYTE ESTERASE UR QL STRIP: ABNORMAL
NITRATE UR QL: NEGATIVE
PH UR STRIP: 6.5 PH (ref 5–7)
RBC #/AREA URNS AUTO: <1 /HPF (ref 0–2)
SOURCE: ABNORMAL
SP GR UR STRIP: 1.01 (ref 1–1.03)
SQUAMOUS #/AREA URNS AUTO: 1 /HPF (ref 0–1)
TRANS CELLS #/AREA URNS HPF: <1 /HPF (ref 0–1)
UROBILINOGEN UR STRIP-MCNC: NORMAL MG/DL (ref 0–2)
WBC #/AREA URNS AUTO: 3 /HPF (ref 0–5)

## 2019-11-21 PROCEDURE — G0463 HOSPITAL OUTPT CLINIC VISIT: HCPCS | Mod: ZF

## 2019-11-21 PROCEDURE — 81001 URINALYSIS AUTO W/SCOPE: CPT | Performed by: STUDENT IN AN ORGANIZED HEALTH CARE EDUCATION/TRAINING PROGRAM

## 2019-11-21 PROCEDURE — 87086 URINE CULTURE/COLONY COUNT: CPT | Performed by: STUDENT IN AN ORGANIZED HEALTH CARE EDUCATION/TRAINING PROGRAM

## 2019-11-21 RX ORDER — NITROFURANTOIN 25; 75 MG/1; MG/1
100 CAPSULE ORAL 2 TIMES DAILY
Qty: 10 CAPSULE | Refills: 0 | Status: SHIPPED | OUTPATIENT
Start: 2019-11-21 | End: 2019-11-26

## 2019-11-21 ASSESSMENT — ANXIETY QUESTIONNAIRES
1. FEELING NERVOUS, ANXIOUS, OR ON EDGE: MORE THAN HALF THE DAYS
3. WORRYING TOO MUCH ABOUT DIFFERENT THINGS: SEVERAL DAYS
GAD7 TOTAL SCORE: 9
5. BEING SO RESTLESS THAT IT IS HARD TO SIT STILL: SEVERAL DAYS
7. FEELING AFRAID AS IF SOMETHING AWFUL MIGHT HAPPEN: SEVERAL DAYS
6. BECOMING EASILY ANNOYED OR IRRITABLE: MORE THAN HALF THE DAYS
2. NOT BEING ABLE TO STOP OR CONTROL WORRYING: SEVERAL DAYS

## 2019-11-21 ASSESSMENT — PATIENT HEALTH QUESTIONNAIRE - PHQ9
SUM OF ALL RESPONSES TO PHQ QUESTIONS 1-9: 8
5. POOR APPETITE OR OVEREATING: SEVERAL DAYS

## 2019-11-21 ASSESSMENT — PAIN SCALES - GENERAL: PAINLEVEL: SEVERE PAIN (6)

## 2019-11-21 NOTE — PROGRESS NOTES
[facility]  Follow-Up Visit    S: Amilcar Cope is a 24 year old transgender man (he/his) here for pain and bleeding after robotic-assisted hysterectomy-BSO on 9/27/2019. Was seen in clinic on 11/12 for postop, everything was going well at that time.     He went to the chiropractor this past Monday and had a low back/pelvic readjustment as he has done for the past 3 weeks and since then he has had dysuria, low back pain and pain when having bowel movements. No constipation, hematochezia, hematuria, urinary frequency. He also reports an episode of vaginal bleeding on Tuesday of this week, bright red, needed to wear a pad, no precipitating event, was more bleeding than he'd had since 3 weeks postop. He has not penetrative intercourse since surgery. He has used premarin cream 1x since surgery (this past Thursday).     O:  /78   Pulse 73   Wt 108 kg (238 lb)   LMP 06/27/2019 (Approximate)   Breastfeeding No   BMI 38.41 kg/m    Gen: Well-appearing, NAD  HEENT: Normocephalic, atraumatic  CV:        Well perfused; no LE edema  Pulm:  Normal respiratory effort and rate  Abd: Soft, non-tender, non-distended    Pelvic:  Increased male pattern hair growth, mild clitoromegaly otherwise normal external female genitalia. Rory speculum placed without discomfort. Normal physiologic discharge. Vaginal cuff appears well-healed, no granulation tissue present. No blood in vaginal vault. No lesions.  On bimanual exam the cuff is intact, no induration or masses palpated     A/P:  Ms. Amilcar Cope is a 24 year old 8w post-op from robot assisted hysterectomy and BSO here for dysuria and dyschezia after chiropractic appt, single episode of vaginal bleeding. Cuff appears well-healed.     #pelvic pain post-op  - will order UA w/ culture  - recommend tylenol, ibuprofen, colace PRN  - RTC if pain is not improving         Eva Harper MD MSc  OB/GYN PGY-1  11/21/19 7:47 AM    The patient was seen and examined with  Dr. Harper.  I have reviewed and edited the above note.    Radha Velez MD, FACOG

## 2019-11-21 NOTE — PROGRESS NOTES
[facility]:  Care Coordination Note     SITUATION   Patient (Amilcar, He/Him) is a 24 year old who is receiving support for:  Clinic Care Coordination - Follow-up (Recieved letter of support - Top surgery)  .    BACKGROUND     Pt submitted adequate letter of support for top surgery with Dr. Pratt.    Per Dr. Pratt's note, pt needs to see Dr. Copeland due to extensive Hx of breast cancer.     Needs mammogram.     ASSESSMENT     Surgery              CGC Assessment  [facility] ([facility] Enrollment: Enrolled  Patient has a therapist: Yes  Name of therapist: Alesha Gardner at [facility]  Letter of support #1: Received  Letter #1 Date: 11/19/19  Surgery being considered: Yes  Mastectomy: Yes  Hysterectomy completed: Yes  Hysterectomy Date: 09/27/19          PLAN          Nursing Interventions:   Reviewed letter of support for WPATH standards of care which is adequate.     Follow-up plan:  Dr. Pratt's RNCC/LPN to help pt navigate to see Dr. Ashley Copeland for breast cancer evaluation.     Pt needs mammogram.        Severo Reagan

## 2019-11-21 NOTE — LETTER
11/21/2019       RE: Amilcar Cope  [address] [facility] [address]     Dear Colleague,    Thank you for referring your patient, Amilcar Cope, to the [facility] at [facility]. Please see a copy of my visit note below.    [facility]  Follow-Up Visit    S: Amilcar Cope is a 24 year old transgender man (he/his) here for pain and bleeding after robotic-assisted hysterectomy-BSO on 9/27/2019. Was seen in clinic on 11/12 for postop, everything was going well at that time.     He went to the chiropractor this past Monday and had a low back/pelvic readjustment as he has done for the past 3 weeks and since then he has had dysuria, low back pain and pain when having bowel movements. No constipation, hematochezia, hematuria, urinary frequency. He also reports an episode of vaginal bleeding on Tuesday of this week, bright red, needed to wear a pad, no precipitating event, was more bleeding than he'd had since 3 weeks postop. He has not penetrative intercourse since surgery. He has used premarin cream 1x since surgery (this past Thursday).     O:  /78   Pulse 73   Wt 108 kg (238 lb)   LMP 06/27/2019 (Approximate)   Breastfeeding No   BMI 38.41 kg/m     Gen: Well-appearing, NAD  HEENT: Normocephalic, atraumatic  CV:        Well perfused; no LE edema  Pulm:  Normal respiratory effort and rate  Abd: Soft, non-tender, non-distended    Pelvic:  Increased male pattern hair growth, mild clitoromegaly otherwise normal external female genitalia. Rory speculum placed without discomfort. Normal physiologic discharge. Vaginal cuff appears well-healed, no granulation tissue present. No blood in vaginal vault. No lesions.  On bimanual exam the cuff is intact, no induration or masses palpated     A/P:  Ms. Amilcar Cope is a 24 year old 8w post-op from robot assisted hysterectomy and BSO here for dysuria and dyschezia after chiropractic appt,  single episode of vaginal bleeding. Cuff appears well-healed.     #pelvic pain post-op  - will order UA w/ culture  - recommend tylenol, ibuprofen, colace PRN  - RTC if pain is not improving         Eva Harper MD MSc  OB/GYN PGY-1  11/21/19 7:47 AM    The patient was seen and examined with Dr. Harper.  I have reviewed and edited the above note.    Radha Velez MD, FACOG

## 2019-11-21 NOTE — NURSING NOTE
Chief Complaint   Patient presents with     Follow Up     C/O post hysterectomy pain/bleeding   Maribeth Mccall LPN

## 2019-11-22 LAB
BACTERIA SPEC CULT: NORMAL
BACTERIA SPEC CULT: NORMAL
Lab: NORMAL
SPECIMEN SOURCE: NORMAL

## 2019-11-22 ASSESSMENT — ANXIETY QUESTIONNAIRES: GAD7 TOTAL SCORE: 9

## 2020-01-07 ENCOUNTER — TELEPHONE (OUTPATIENT)
Dept: SURGERY | Facility: CLINIC | Age: 25
End: 2020-01-07

## 2020-01-08 ENCOUNTER — TELEPHONE (OUTPATIENT)
Dept: SURGERY | Facility: CLINIC | Age: 25
End: 2020-01-08

## 2020-01-08 NOTE — TELEPHONE ENCOUNTER
received a VM from [email or web address], code [id number] of redundant, ok to take off PA request? yes

## 2020-01-09 ENCOUNTER — TELEPHONE (OUTPATIENT)
Dept: SURGERY | Facility: CLINIC | Age: 25
End: 2020-01-09

## 2020-01-09 NOTE — TELEPHONE ENCOUNTER
Received [facility] PA approval #[id number] for mary jo matectomy, with a date span 1/7/20-7/4/2020

## 2020-01-29 ENCOUNTER — PRE VISIT (OUTPATIENT)
Dept: ONCOLOGY | Facility: CLINIC | Age: 25
End: 2020-01-29

## 2020-01-29 ENCOUNTER — OFFICE VISIT (OUTPATIENT)
Dept: SURGERY | Facility: CLINIC | Age: 25
End: 2020-01-29
Attending: PHYSICIAN ASSISTANT
Payer: COMMERCIAL

## 2020-01-29 VITALS
DIASTOLIC BLOOD PRESSURE: 85 MMHG | HEIGHT: 66 IN | RESPIRATION RATE: 14 BRPM | WEIGHT: 239.4 LBS | SYSTOLIC BLOOD PRESSURE: 137 MMHG | OXYGEN SATURATION: 98 % | TEMPERATURE: 97.3 F | BODY MASS INDEX: 38.47 KG/M2 | HEART RATE: 77 BPM

## 2020-01-29 DIAGNOSIS — Z80.3 FAMILY HISTORY OF BREAST CANCER: Primary | ICD-10-CM

## 2020-01-29 PROCEDURE — G0463 HOSPITAL OUTPT CLINIC VISIT: HCPCS | Mod: ZF

## 2020-01-29 PROCEDURE — 99204 OFFICE O/P NEW MOD 45 MIN: CPT | Mod: ZP | Performed by: PHYSICIAN ASSISTANT

## 2020-01-29 RX ORDER — TESTOSTERONE CYPIONATE 200 MG/ML
INJECTION, SOLUTION INTRAMUSCULAR WEEKLY
COMMUNITY
Start: 2020-01-20

## 2020-01-29 ASSESSMENT — MIFFLIN-ST. JEOR: SCORE: 2023.41

## 2020-01-29 ASSESSMENT — PAIN SCALES - GENERAL: PAINLEVEL: NO PAIN (0)

## 2020-01-29 NOTE — LETTER
1/29/2020       RE: Amilcar Cope  [address] [facility] [address]     Dear Colleague,    Thank you for referring your patient, Amilcar Cope, to the [facility]. Please see a copy of my visit note below.    NEW CONSULTATION   Jan 29, 2020     Amilcar Cope is a 24 year old biological female who presents with family history of breast cancer and plans for Top surgery, mastectomy with male chest reconstruction.     HPI:    Amilcar is a biological female transitioning to male. He plans to undergo Top surgery with bilateral mastectomy and male chest reconstruction with Dr. Pratt. He is here today for risk assessment given his family history of breast cancer. He denies any breast concerns today including mass, skin changes, nipple inversion or nipple discharge. He had a right breast ultrasound for a right breast mass in 2018 that was negative.     Family history of male breast cancer in paternal grandfather diagnosed at age 52 and lived into his 80's. Paternal aunt diagnosed with breast cancer at age 58, living in her 60's. Maternal great grandmother with breast cancer diagnosed in her 80's. Maternal great aunt diagnosed with breast cancer in her 50's and history of uterine cancer.    BREAST-SPECIFIC HISTORY:    Previous breast imaging: Yes   - 4/2/18 right breast ultrasound for a mass: negative BI-RADS 1  - 11/13/19 Smammo BI-RADS 1    Prior breast biopsies/surgeries: No    Prior history of breast cancer: No  Prior radiation history: No   Self breast exams: Yes  Breast density:     GYN HISTORY:  G0  Age at menarche: 11    RISK ASSESSMENT: > 20% lifetime risk   Jessica:n/a  ALAINA: 21.9% lifetime risk   Ady: 15.8% lifetime risk     FAMILY HISTORY:  Breast ca: Yes   - paternal grandfather, 52, lived into his 80's   - paternal aunt, 58, stilling living in her 60's  - maternal great grandmother, bilateral mastectomy 80's, did not pass of breast cancer  - maternal great aunt, 50's, living  into her 70's, also with uterine cancer  Ovarian ca: No  Pancreatic ca: No  Prostate: No  Gastric ca: No  Melanoma: No  Colon ca: No  Other cancer: Yes   - maternal grandmother with uterine cancer  - maternal grandfather lung cancer diagnosed age 48  - paternal grandmother with lung cancer  - maternal great aunt with uterine and breast cancer  - maternal great grandmother with uterine cancer  - maternal great aunt with bladder cancer, 84  Other genetic, testing, syndromes, or clotting disorders: no     PAST MEDICAL HISTORY  Past Medical History:   Diagnosis Date     Anxiety      Asthma     child     Depression      Hypogammaglobulinemia (H)     sinus infections     PONV (postoperative nausea and vomiting)      [place] teeth extracted      PAST SURGICAL HISTORY   Past Surgical History:   Procedure Laterality Date     DAVINCI HYSTERECTOMY TOTAL, OOPHORECTOMY BILATERAL Bilateral 9/27/2019    Procedure: Davinci Assisted Total Laparoscopic Hysterectomy, Bilateral Salpingo Oophoretomy,;  Surgeon: Emiliana Pillai MD;  Location: [facility] OR   Hysterectomy: yes    MEDICATIONS  Current Outpatient Medications   Medication Sig Dispense Refill     cetirizine (ZYRTEC) 10 MG tablet Take 10 mg by mouth daily       Cholecalciferol (VITAMIN D) 2000 units CAPS Take by mouth daily        diphenhydrAMINE (BENADRYL) 50 MG capsule Take 50 mg by mouth At Bedtime       escitalopram (LEXAPRO) 10 MG tablet Take 10 mg by mouth daily       melatonin 5 MG tablet Take 10 mg by mouth nightly as needed for sleep       SUMAtriptan Succinate (IMITREX PO) Take 25 mg by mouth as needed        PREMARIN 0.625 MG/GM vaginal cream INSERT 2 G INTRAVAGINALLY ONCE DAILY.  11     testosterone cypionate (DEPOTESTOSTERONE) 200 MG/ML injection        Testosterone Cypionate 50 MG/ML SOLN Inject as directed once a week        ALLERGIES  Allergies   Allergen Reactions     Sulfa Drugs Anaphylaxis     tachycardia      SOCIAL HISTORY:  Smokes: No  EtOH: No  Exercise:  "walking,      ROS:  Change in vision No  Respiratory: No shortness of breath, dyspnea on exertion, cough, or hemoptysis   Cardiovascular: negative   Gastrointestinal: negative Abdominal pain: no  Breast: negative  Musculoskeletal: negative Joint pain No Back pain: no  Psychiatric: negative  Hematologic/Lymphatic/Immunologic: negative  Endocrine: negative    EXAM  /85 (BP Location: Right arm, Patient Position: Chair, Cuff Size: Adult Large)   Pulse 77   Temp 97.3  F (36.3  C)   Resp 14   Ht 1.676 m (5' 5.98\")   Wt 108.6 kg (239 lb 6.4 oz)   LMP 06/27/2019 (Approximate)   SpO2 98%   BMI 38.66 kg/m      PHYSICAL EXAM  Respiratory: breathing non labored.   Breasts: Examination was done in both the upright and supine positions.  Breasts are symmetrical . No dominant fixed or suspicious masses noted. No skin or nipple changes. No nipple discharge. Prominent nodular dense breast tissue 9-12:00, previously imaged and stable per patient.   No clavicular, cervical, or axillary lymphadenopathy.     ASSESSMENT/PLAN:    Amilcar Cope is a 24 year old biological female transitioning to male and planning for upcoming Top surgery. He has a family history of breast cancer including male breast cancer. He meets NCCN guidelines for high risk screening.     1) Family history of breast cancer  Discussed he meets NCCN guidelines for high risk screening based on family history with lifetime risk for breast cancer of >20%. Lifetime risk of breast cancer by ALAINA was 21.9% lifetime risk but this did not take into account paternal grandfather with breast cancer. Screening recommendations based on NCCN guidelines. Clinical encounter every 6-12 months starting now. Annual mammogram with jeanne starting 10 years prior to the youngest family member but not less than age 30. Annual breast MRI to begin 10 years prior to the youngest family member diagnosed but not less than 25 years old. Counseling was provided with available " strategies including lifestyle modifications and risk reducing intervention.   - Referral to Genetic Counseling for genetic testing  - Prior to Top surgery would recommend beginning annual screening mammograms at age 40 and consider breast MRI starting at age 42.   - After Top surgery screening would depend on how much breast tissue is remaining.     2) Lifestyle Modifications were provided.   - Maintain a healthy weight. Your BMI is Body mass index is 38.66 kg/m . Recommended BMI is 20-25. Higher body mass index (BMI) and adult weight gain is associated with increased risk for breast cancer. This increase in risk has been attributed to increase in circulating endogenous estrogen levels from fat tissue.   - Alcohol consumption, even at moderate levels (1-2 drinks per day), increases breast cancer risk. Limit alcohol consumption to less than 1 drink per day. (1 ounce of liquor, 6 ounces of wine, or 8 ounces of beer)  - Exercise a minimum of 3 hours per week of moderate-intensity aerobic activity.     Yuli Awad PA-C    Total time spent face to face with the patient was 45 minutes. 35 minutes was spent counseling the patient as described above.

## 2020-01-29 NOTE — TELEPHONE ENCOUNTER
ONCOLOGY INTAKE: Records Information      APPT INFORMATION: [id number] Liliana Rosado  Referring provider:  Yuli Awad  Referring provider s clinic:   [facility]  Reason for visit/diagnosis:  Family history of breast cancer [Z80.3]  Has patient been notified of appointment date and time?: YEs    RECORDS INFORMATION:  Were the records received with the referral (via [employer])? Internal Referral    Has patient been seen for any external appt for this diagnosis? No    If yes, where? NA    Has patient had any imaging or procedures outside of [place] for this condition? No      If Yes, where? NA    ADDITIONAL INFORMATION:  None

## 2020-01-29 NOTE — NURSING NOTE
"Oncology Rooming Note    January 29, 2020 11:02 AM   Amilcar Cope is a 24 year old adult who presents for:    Chief Complaint   Patient presents with     Oncology Clinic Visit     [facility] NEW- FAMILY HISTORY OF BREAST CA     Initial Vitals: /85 (BP Location: Right arm, Patient Position: Chair, Cuff Size: Adult Large)   Pulse 77   Temp 97.3  F (36.3  C)   Resp 14   Ht 1.676 m (5' 5.98\")   Wt 108.6 kg (239 lb 6.4 oz)   LMP 06/27/2019 (Approximate)   SpO2 98%   BMI 38.66 kg/m   Estimated body mass index is 38.66 kg/m  as calculated from the following:    Height as of this encounter: 1.676 m (5' 5.98\").    Weight as of this encounter: 108.6 kg (239 lb 6.4 oz). Body surface area is 2.25 meters squared.  No Pain (0) Comment: Data Unavailable   Patient's last menstrual period was 06/27/2019 (approximate).  Allergies reviewed: Yes  Medications reviewed: Yes    Medications: Medication refills not needed today.  Pharmacy name entered into [employer]: [id number] - [place], [place] - [address] [facility] [facility] [address]    Clinical concerns: No new concerns. Yuli Awad was notified.      Rao Amezquita LPN            "

## 2020-01-29 NOTE — PATIENT INSTRUCTIONS
Amilcar Cope is a 24 year old biological female transitioning to male and planning for up coming Top surgery. He has a family history of breast cancer including male breast cancer. He meets NCCN guidelines for high risk screening.     1) Family history of breast cancer  Discussed he meets NCCN guidelines for high risk screening based on family history with lifetime risk for breast cancer of >20%. Lifetime risk of breast cancer by ALAINA was 21.9% lifetime risk but this did not take into account paternal grandfather with breast cancer. Screening recommendations based on NCCN guidelines. Clinical encounter every 6-12 months starting now. Annual mammogram with jeanne starting 10 years prior to the youngest family member but not less than age 30. Annual breast MRI to begin 10 years prior to the youngest family member diagnosed but not less than 25 years old. Counseling was provided with available strategies including lifestyle modifications and risk reducing intervention.   - Referral to Genetic Counseling for genetic testing  - Prior to Top surgery would recommend beginning annual screening mammograms at age 40 and consider breast MRI starting at age 42.   - After Top surgery screening would depend on how much breast tissue is remaining.     2) Lifestyle Modifications were provided.   - Maintain a healthy weight. Your BMI is Body mass index is 38.66 kg/m . Recommended BMI is 20-25. Higher body mass index (BMI) and adult weight gain is associated with increased risk for breast cancer. This increase in risk has been attributed to increase in circulating endogenous estrogen levels from fat tissue.   - Alcohol consumption, even at moderate levels (1-2 drinks per day), increases breast cancer risk. Limit alcohol consumption to less than 1 drink per day. (1 ounce of liquor, 6 ounces of wine, or 8 ounces of beer)  - Exercise a minimum of 3 hours per week of moderate-intensity aerobic activity.

## 2020-01-29 NOTE — PROGRESS NOTES
NEW CONSULTATION   Jan 29, 2020     Amilcar Cope is a 24 year old biological female who presents with family history of breast cancer and plans for Top surgery, mastectomy with male chest reconstruction.     HPI:    Amilcar is a biological female transitioning to male. He plans to undergo Top surgery with bilateral mastectomy and male chest reconstruction with Dr. Pratt. He is here today for risk assessment given his family history of breast cancer. He denies any breast concerns today including mass, skin changes, nipple inversion or nipple discharge. He had a right breast ultrasound for a right breast mass in 2018 that was negative.     Family history of male breast cancer in paternal grandfather diagnosed at age 52 and lived into his 80's. Paternal aunt diagnosed with breast cancer at age 58, living in her 60's. Maternal great grandmother with breast cancer diagnosed in her 80's. Maternal great aunt diagnosed with breast cancer in her 50's and history of uterine cancer.    BREAST-SPECIFIC HISTORY:    Previous breast imaging: Yes   - 4/2/18 right breast ultrasound for a mass: negative BI-RADS 1  - 11/13/19 Smammo BI-RADS 1    Prior breast biopsies/surgeries: No    Prior history of breast cancer: No  Prior radiation history: No   Self breast exams: Yes  Breast density:     GYN HISTORY:  G0  Age at menarche: 11    RISK ASSESSMENT: > 20% lifetime risk   Jessica:n/a  ALAINA: 21.9% lifetime risk   Ady: 15.8% lifetime risk     FAMILY HISTORY:  Breast ca: Yes   - paternal grandfather, 52, lived into his 80's   - paternal aunt, 58, stilling living in her 60's  - maternal great grandmother, bilateral mastectomy 80's, did not pass of breast cancer  - maternal great aunt, 50's, living into her 70's, also with uterine cancer  Ovarian ca: No  Pancreatic ca: No  Prostate: No  Gastric ca: No  Melanoma: No  Colon ca: No  Other cancer: Yes   - maternal grandmother with uterine cancer  - maternal grandfather lung cancer diagnosed  age 48  - paternal grandmother with lung cancer  - maternal great aunt with uterine and breast cancer  - maternal great grandmother with uterine cancer  - maternal great aunt with bladder cancer, 84  Other genetic, testing, syndromes, or clotting disorders: no     PAST MEDICAL HISTORY  Past Medical History:   Diagnosis Date     Anxiety      Asthma     child     Depression      Hypogammaglobulinemia (H)     sinus infections     PONV (postoperative nausea and vomiting)      [place] teeth extracted      PAST SURGICAL HISTORY   Past Surgical History:   Procedure Laterality Date     DAVINCI HYSTERECTOMY TOTAL, OOPHORECTOMY BILATERAL Bilateral 9/27/2019    Procedure: Davinci Assisted Total Laparoscopic Hysterectomy, Bilateral Salpingo Oophoretomy,;  Surgeon: Emiliana Pillai MD;  [facility]   Hysterectomy: yes    MEDICATIONS  Current Outpatient Medications   Medication Sig Dispense Refill     cetirizine (ZYRTEC) 10 MG tablet Take 10 mg by mouth daily       Cholecalciferol (VITAMIN D) 2000 units CAPS Take by mouth daily        diphenhydrAMINE (BENADRYL) 50 MG capsule Take 50 mg by mouth At Bedtime       escitalopram (LEXAPRO) 10 MG tablet Take 10 mg by mouth daily       melatonin 5 MG tablet Take 10 mg by mouth nightly as needed for sleep       SUMAtriptan Succinate (IMITREX PO) Take 25 mg by mouth as needed        PREMARIN 0.625 MG/GM vaginal cream INSERT 2 G INTRAVAGINALLY ONCE DAILY.  11     testosterone cypionate (DEPOTESTOSTERONE) 200 MG/ML injection        Testosterone Cypionate 50 MG/ML SOLN Inject as directed once a week        ALLERGIES  Allergies   Allergen Reactions     Sulfa Drugs Anaphylaxis     tachycardia      SOCIAL HISTORY:  Smokes: No  EtOH: No  Exercise: walking,      ROS:  Change in vision No  Respiratory: No shortness of breath, dyspnea on exertion, cough, or hemoptysis   Cardiovascular: negative   Gastrointestinal: negative Abdominal pain: no  Breast: negative  Musculoskeletal: negative Joint  "pain No Back pain: no  Psychiatric: negative  Hematologic/Lymphatic/Immunologic: negative  Endocrine: negative    EXAM  /85 (BP Location: Right arm, Patient Position: Chair, Cuff Size: Adult Large)   Pulse 77   Temp 97.3  F (36.3  C)   Resp 14   Ht 1.676 m (5' 5.98\")   Wt 108.6 kg (239 lb 6.4 oz)   LMP 06/27/2019 (Approximate)   SpO2 98%   BMI 38.66 kg/m     PHYSICAL EXAM  Respiratory: breathing non labored.   Breasts: Examination was done in both the upright and supine positions.  Breasts are symmetrical . No dominant fixed or suspicious masses noted. No skin or nipple changes. No nipple discharge. Prominent nodular dense breast tissue 9-12:00, previously imaged and stable per patient.   No clavicular, cervical, or axillary lymphadenopathy.     ASSESSMENT/PLAN:    Amilcar Cope is a 24 year old biological female transitioning to male and planning for upcoming Top surgery. He has a family history of breast cancer including male breast cancer. He meets NCCN guidelines for high risk screening.     1) Family history of breast cancer  Discussed he meets NCCN guidelines for high risk screening based on family history with lifetime risk for breast cancer of >20%. Lifetime risk of breast cancer by ALAINA was 21.9% lifetime risk but this did not take into account paternal grandfather with breast cancer. Screening recommendations based on NCCN guidelines. Clinical encounter every 6-12 months starting now. Annual mammogram with jeanne starting 10 years prior to the youngest family member but not less than age 30. Annual breast MRI to begin 10 years prior to the youngest family member diagnosed but not less than 25 years old. Counseling was provided with available strategies including lifestyle modifications and risk reducing intervention.   - Referral to Genetic Counseling for genetic testing  - Prior to Top surgery would recommend beginning annual screening mammograms at age 40 and consider breast MRI starting at " age 42.   - After Top surgery screening would depend on how much breast tissue is remaining.     2) Lifestyle Modifications were provided.   - Maintain a healthy weight. Your BMI is Body mass index is 38.66 kg/m . Recommended BMI is 20-25. Higher body mass index (BMI) and adult weight gain is associated with increased risk for breast cancer. This increase in risk has been attributed to increase in circulating endogenous estrogen levels from fat tissue.   - Alcohol consumption, even at moderate levels (1-2 drinks per day), increases breast cancer risk. Limit alcohol consumption to less than 1 drink per day. (1 ounce of liquor, 6 ounces of wine, or 8 ounces of beer)  - Exercise a minimum of 3 hours per week of moderate-intensity aerobic activity.     Yuli Awad PA-C    Total time spent face to face with the patient was 45 minutes. 35 minutes was spent counseling the patient as described above.

## 2020-02-04 ENCOUNTER — OFFICE VISIT (OUTPATIENT)
Dept: ONCOLOGY | Facility: CLINIC | Age: 25
End: 2020-02-04
Attending: PHYSICIAN ASSISTANT
Payer: COMMERCIAL

## 2020-02-04 ENCOUNTER — PRE VISIT (OUTPATIENT)
Dept: ONCOLOGY | Facility: CLINIC | Age: 25
End: 2020-02-04

## 2020-02-04 DIAGNOSIS — Z80.3 FAMILY HISTORY OF MALIGNANT NEOPLASM OF BREAST: Primary | ICD-10-CM

## 2020-02-04 DIAGNOSIS — Z80.3 FAMILY HISTORY OF BREAST CANCER IN MALE: ICD-10-CM

## 2020-02-04 PROCEDURE — 96040 ZZH GENETIC COUNSELING, EACH 30 MINUTES: CPT | Mod: ZF | Performed by: GENETIC COUNSELOR, MS

## 2020-02-04 PROCEDURE — 40001076 ZZHCL STATISTICAL OVANEXT: Performed by: GENETIC COUNSELOR, MS

## 2020-02-04 PROCEDURE — 36415 COLL VENOUS BLD VENIPUNCTURE: CPT

## 2020-02-04 NOTE — LETTER
RE: Amilcar Cope  [address] [facility] [address]     Dear Colleague,    Thank you for referring your patient, Amilcar Cope, to the [facility]. Please see a copy of my visit note below.    2/4/2020    Referring Provider: Yuli Awad PA-C    Presenting Information:   I met with Amilcar Cope today for genetic counseling at [facility] to discuss his family history of breast cancer.  He is here today to review this history, cancer screening recommendations, and available genetic testing options.    Personal History:  Amilcar is a 24 year old biological female transitioning to male. He had a hysterectomy (including bilateral ovaries/fallopian tubes, uterus and cervix) on 9/27/2019.  He was seen by Dr. Jenn Pratt to discuss top surgery; he was referred to genetic counseling prior to scheduling this surgery due to the family history of breast cancer. He was seen by Yuli Awad PA-C 1/29/2020. Mammogram 11/13/2019 was negative.      Family History: (Please see scanned pedigree for detailed family history information)    Wiley's paternal grandfather was diagnosed with male breast cancer at age 52, and passed away at 88.  He had three nieces who passed away due to breast cancer    Wiley's paternal aunt was diagnosed with breast cancer at age 60 and is currently 67    His paternal grandmother was diagnosed with lung cancer at age 55    Wiley's mother and maternal grandmother do not have a personal cancer history, however his grandmother's sister was diagnosed with breast and gynecologic cancer (ovarian and/or uterine) in her 50's.  Ofelias great-grandmother (through his maternal grandmother) also had a history of breast cancer    His maternal grandfather passed away at age 52 due to an adenocarcinoma of the lung/heart lining.      His maternal ethnicity is Greenlandic/Honduran/Slovak/English/Scandinavian. His paternal ethnicity is  Polish/Dutch.  He reports Ashkenazi Restorationist ancestry through his paternal grandfather's family.      Discussion:    Amilcar's maternal family history of breast and gynecologic cancer, and paternal family history of breast cancer (including male breast cancer) and Ashkenazi Restorationist ancestry is suggestive of a hereditary cancer syndrome, such as Hereditary Breast and Ovarian Cancer syndrome.    We reviewed the features of sporadic, familial, and hereditary cancers. Based on his paternal family history of breast cancer and Ashkenazi Restorationist ancestry, Amilcar meets current National Comprehensive Cancer Network (NCCN) criteria for genetic testing for high penetrance breast and/or ovarian cancer susceptibility genes.  This often includes BRCA1, BRCA2, CDH1, PALB2, PTEN and TP53, among others.      We discussed the natural history and genetics of Hereditary Breast and Ovarian Cancer syndrome, caused by mutations in the BRCA1 and BRCA2 genes. We discussed that there are additional genes that could cause increased risk for breast cancer, and may help with surgical decision making for Ofelias upcoming surgery. As many of these genes present with overlapping features in a family and accurate cancer risk cannot always be established based upon the pedigree analysis alone, it would be reasonable for Amilcar to consider panel genetic testing to analyze multiple genes at once.    A detailed handout regarding several breast cancer susceptibility genes and the information we discussed was provided to Amilcar at the end of our appointment today and can be found in the after visit summary. Topics included: inheritance pattern, cancer risks, cancer screening recommendations, and also risks, benefits and limitations of testing.    We reviewed genetic testing options for hereditary breast and gynecologic cancers: actionable high/moderate breast and gynecologic cancer risk custom panel (CustomNext-Cancer, 19 genes, a combination of GynPlus and  BRCAplus + NBN, STK11, and NF1) and expanded high and moderate risk panel (OvaNext, 25 genes). Amilcar expressed an interest in learning as much information as possible from the testing. He opted for the OvaNext panel.  Genetic testing is available for 25 genes associated with hereditary gynecologic, breast, and related cancers: OvaNext (ANGY, BARD1, BRCA1, BRCA2, BRIP1, CDH1, CHEK2, DICER1, EPCAM, MLH1, MRE11A, MSH2, MSH6, MUTYH, NBN, NF1, PALB2, PMS2, PTEN, RAD50, RAD51C, RAD51D, SMARCA4, STK11, and TP53).  We discussed that some of the genes in the OvaNext panel are associated with specific hereditary cancer syndromes and published management guidelines: Hereditary Breast and Ovarian Cancer syndrome (BRCA1, BRCA2), Sheffield syndrome (MLH1, MSH2, MSH6, PMS2, EPCAM), Hereditary Diffuse Gastric Cancer (CDH1), Cowden syndrome (PTEN), Li Fraumeni syndrome (TP53), Peutz-Jeghers syndrome (STK11), MUTYH Associated Polyposis (MUTYH), and Neurofibromatosis type 1 (NF1).    Risk-reducing salpingo-oophorectomy can be considered in women with mutations in BRIP1, RAD51C, or RAD51D. Breast and/or other cancer risk management guidelines are available for ANGY, CHEK2, PALB2, NF1, and NBN.  The remaining genes (BARD1, DICER1, MRE11A, RAD50, and SMARCA4) are associated with increased cancer risk and may allow us to make medical recommendations when mutations are identified.    Amilcar was provided with a detailed brochure from [email or web address] explaining the OvaNext testing, including the genes and their associated cancer risks included in this test.  Consent was obtained and genetic testing for OvaNext was sent to [email or web address]. Turn around time: approximately 3-4weeks.    Medical Management: For Amilcar, we reviewed that the information from genetic testing may determine additional cancer screening for which Amilcar may qualify (such as earlier/more frequent colonoscopies, more frequent dermatologic exams, etc).  Breast  screening options (such as mammogram and MRI imaging) is likely dependent on remaining breast tissue following top surgery. Genetic test results may also provide options for risk reducing surgeries Amilcar could consider (i.e. bilateral mastectomy).  These recommendations will be discussed in detail once genetic testing is completed.     Plan:  1) Today Amilcar elected to proceed with the OvaNext panel through [facility].  2) This information should be available in 3-4 weeks.  3) Amilcar would prefer a phone call once results are available.    Face to face time: 45 minutes    Liliana Rosado MS, [facility]  Licensed Genetic Counselor  [phone]

## 2020-02-04 NOTE — NURSING NOTE
Chief Complaint   Patient presents with     Labs Only     labs drawn with vpt by rn.       Labs drawn with vpt by rn.  Pt tolerated well.  VS taken.  Pt checked in for next appt.    Selena Rizo RN

## 2020-02-04 NOTE — PROGRESS NOTES
2/4/2020    Referring Provider: Yuli Awad PA-C    Presenting Information:   I met with Amilcar Cope today for genetic counseling at the Cancer Risk Management Program at the [facility] to discuss his family history of breast cancer.  He is here today to review this history, cancer screening recommendations, and available genetic testing options.    Personal History:  Amilcar is a 24 year old biological female transitioning to male. He had a hysterectomy (including bilateral ovaries/fallopian tubes, uterus and cervix) on 9/27/2019.  He was seen by Dr. Jenn Pratt to discuss top surgery; he was referred to genetic counseling prior to scheduling this surgery due to the family history of breast cancer. He was seen by Yuli Awad PA-C 1/29/2020. Mammogram 11/13/2019 was negative.      Family History: (Please see scanned pedigree for detailed family history information)    Wiley's paternal grandfather was diagnosed with male breast cancer at age 52, and passed away at 88.  He had three nieces who passed away due to breast cancer    Wiley's paternal aunt was diagnosed with breast cancer at age 60 and is currently 67    His paternal grandmother was diagnosed with lung cancer at age 55    Wiley's mother and maternal grandmother do not have a personal cancer history, however his grandmother's sister was diagnosed with breast and gynecologic cancer (ovarian and/or uterine) in her 50's.  Wiley's great-grandmother (through his maternal grandmother) also had a history of breast cancer    His maternal grandfather passed away at age 52 due to an adenocarcinoma of the lung/heart lining.      His maternal ethnicity is Armenian/Turks and Ca[place] Islander/Japanese/English/Scandinavian. His paternal ethnicity is Polish/Central African.  He reports Ashkenazi Hinduism ancestry through his paternal grandfather's family.      Discussion:    Amilcar's maternal family history of breast and gynecologic cancer, and paternal family history of breast cancer  (including male breast cancer) and Ashkenazi Mandaeism ancestry is suggestive of a hereditary cancer syndrome, such as Hereditary Breast and Ovarian Cancer syndrome.    We reviewed the features of sporadic, familial, and hereditary cancers. Based on his paternal family history of breast cancer and Ashkenazi Mandaeism ancestry, Amilcar meets current National Comprehensive Cancer Network (NCCN) criteria for genetic testing for high penetrance breast and/or ovarian cancer susceptibility genes.  This often includes BRCA1, BRCA2, CDH1, PALB2, PTEN and TP53, among others.      We discussed the natural history and genetics of Hereditary Breast and Ovarian Cancer syndrome, caused by mutations in the BRCA1 and BRCA2 genes. We discussed that there are additional genes that could cause increased risk for breast cancer, and may help with surgical decision making for Wiley's upcoming surgery. As many of these genes present with overlapping features in a family and accurate cancer risk cannot always be established based upon the pedigree analysis alone, it would be reasonable for Amilcar to consider panel genetic testing to analyze multiple genes at once.    A detailed handout regarding several breast cancer susceptibility genes and the information we discussed was provided to Amilcar at the end of our appointment today and can be found in the after visit summary. Topics included: inheritance pattern, cancer risks, cancer screening recommendations, and also risks, benefits and limitations of testing.    We reviewed genetic testing options for hereditary breast and gynecologic cancers: actionable high/moderate breast and gynecologic cancer risk custom panel (CustomNext-Cancer, 19 genes, a combination of GynPlus and BRCAplus + NBN, STK11, and NF1) and expanded high and moderate risk panel (OvaNext, 25 genes). Amilcar expressed an interest in learning as much information as possible from the testing. He opted for the OvaNext panel.  Genetic  testing is available for 25 genes associated with hereditary gynecologic, breast, and related cancers: OvaNext (ANGY, BARD1, BRCA1, BRCA2, BRIP1, CDH1, CHEK2, DICER1, EPCAM, MLH1, MRE11A, MSH2, MSH6, MUTYH, NBN, NF1, PALB2, PMS2, PTEN, RAD50, RAD51C, RAD51D, SMARCA4, STK11, and TP53).  We discussed that some of the genes in the OvaNext panel are associated with specific hereditary cancer syndromes and published management guidelines: Hereditary Breast and Ovarian Cancer syndrome (BRCA1, BRCA2), Sheffield syndrome (MLH1, MSH2, MSH6, PMS2, EPCAM), Hereditary Diffuse Gastric Cancer (CDH1), Cowden syndrome (PTEN), Li Fraumeni syndrome (TP53), Peutz-Jeghers syndrome (STK11), MUTYH Associated Polyposis (MUTYH), and Neurofibromatosis type 1 (NF1).    Risk-reducing salpingo-oophorectomy can be considered in women with mutations in BRIP1, RAD51C, or RAD51D. Breast and/or other cancer risk management guidelines are available for ANGY, CHEK2, PALB2, NF1, and NBN.  The remaining genes (BARD1, DICER1, MRE11A, RAD50, and SMARCA4) are associated with increased cancer risk and may allow us to make medical recommendations when mutations are identified.    Amilcar was provided with a detailed brochure from [employer] explaining the OvaNext testing, including the genes and their associated cancer risks included in this test.  Consent was obtained and genetic testing for OvaNext was sent to [facility]. Turn around time: approximately 3-4weeks.    Medical Management: For Amilcar, we reviewed that the information from genetic testing may determine additional cancer screening for which Amilcar may qualify (such as earlier/more frequent colonoscopies, more frequent dermatologic exams, etc).  Breast screening options (such as mammogram and MRI imaging) is likely dependent on remaining breast tissue following top surgery. Genetic test results may also provide options for risk reducing surgeries Amilcar could consider (i.e. bilateral  mastectomy).  These recommendations will be discussed in detail once genetic testing is completed.     Plan:  1) Today Amilcar elected to proceed with the OvaNext panel through [employer].  2) This information should be available in 3-4 weeks.  3) Amilcar would prefer a phone call once results are available.    Face to face time: 45 minutes    Liliana Rosado MS, [facility]  Licensed Genetic Counselor  [phone]

## 2020-02-04 NOTE — LETTER
Cancer Risk Management  Program Locations    [facility]  [facility]  Mailing Address  Cancer Risk Management Program  [facility]  [address]  [facility] [address]  [place] [address]    New patient appointments  [phone]  February 6, 2020    Amilcar Cope  [address] [facility] [id number]      Dear Amilcar,    It was a pleasure meeting with you at the [facility] on 2/4/2020. Here is a copy of the progress note from your recent genetic counseling visit to the Cancer Risk Management Program. If you have any additional questions, please feel free to call.    Referring Provider: Yuli Awad PA-C    Presenting Information:   I met with Amilcar Cope today for genetic counseling at the Cancer Risk Management Program at the [facility] to discuss his family history of breast cancer.  He is here today to review this history, cancer screening recommendations, and available genetic testing options.    Personal History:  Amilcar is a 24 year old biological female transitioning to male. He had a hysterectomy (including bilateral ovaries/fallopian tubes, uterus and cervix) on 9/27/2019.  He was seen by Dr. Jenn Pratt to discuss top surgery; he was referred to genetic counseling prior to scheduling this surgery due to the family history of breast cancer. He was seen by Yuli Awad PA-C 1/29/2020. Mammogram 11/13/2019 was negative.      Family History: (Please see scanned pedigree for detailed family history information)    Wiley's paternal grandfather was diagnosed with male breast cancer at age 52, and passed away at 88.  He had three nieces who passed away due to breast cancer    Wiley's paternal aunt was diagnosed with breast cancer at age 60 and is currently 67    His paternal grandmother was diagnosed  with lung cancer at age 55    Wiley's mother and maternal grandmother do not have a personal cancer history, however his grandmother's sister was diagnosed with breast and gynecologic cancer (ovarian and/or uterine) in her 50's.  Wiley's great-grandmother (through his maternal grandmother) also had a history of breast cancer    His maternal grandfather passed away at age 52 due to an adenocarcinoma of the lung/heart lining.      His maternal ethnicity is Gambian/Stateless/Georgian/English/Scandinavian. His paternal ethnicity is Polish/Cymro.  He reports Ashkenazi Mormonism ancestry through his paternal grandfather's family.      Discussion:    Amilcar's maternal family history of breast and gynecologic cancer, and paternal family history of breast cancer (including male breast cancer) and Ashkenazi Mormonism ancestry is suggestive of a hereditary cancer syndrome, such as Hereditary Breast and Ovarian Cancer syndrome.    We reviewed the features of sporadic, familial, and hereditary cancers. Based on his paternal family history of breast cancer and Ashkenazi Mormonism ancestry, Amilcar meets current National Comprehensive Cancer Network (NCCN) criteria for genetic testing for high penetrance breast and/or ovarian cancer susceptibility genes.  This often includes BRCA1, BRCA2, CDH1, PALB2, PTEN and TP53, among others.      We discussed the natural history and genetics of Hereditary Breast and Ovarian Cancer syndrome, caused by mutations in the BRCA1 and BRCA2 genes. We discussed that there are additional genes that could cause increased risk for breast cancer, and may help with surgical decision making for Ofelias upcoming surgery. As many of these genes present with overlapping features in a family and accurate cancer risk cannot always be established based upon the pedigree analysis alone, it would be reasonable for Amilcar to consider panel genetic testing to analyze multiple genes at once.    A detailed handout regarding several breast  cancer susceptibility genes and the information we discussed was provided to Amilcar at the end of our appointment today and can be found in the after visit summary. Topics included: inheritance pattern, cancer risks, cancer screening recommendations, and also risks, benefits and limitations of testing.    We reviewed genetic testing options for hereditary breast and gynecologic cancers: actionable high/moderate breast and gynecologic cancer risk custom panel (CustomNext-Cancer, 19 genes, a combination of GynPlus and BRCAplus + NBN, STK11, and NF1) and expanded high and moderate risk panel (OvaNext, 25 genes). Amilcar expressed an interest in learning as much information as possible from the testing. He opted for the OvaNext panel.  Genetic testing is available for 25 genes associated with hereditary gynecologic, breast, and related cancers: OvaNext (ANGY, BARD1, BRCA1, BRCA2, BRIP1, CDH1, CHEK2, DICER1, EPCAM, MLH1, MRE11A, MSH2, MSH6, MUTYH, NBN, NF1, PALB2, PMS2, PTEN, RAD50, RAD51C, RAD51D, SMARCA4, STK11, and TP53).  We discussed that some of the genes in the OvaNext panel are associated with specific hereditary cancer syndromes and published management guidelines: Hereditary Breast and Ovarian Cancer syndrome (BRCA1, BRCA2), Sheffield syndrome (MLH1, MSH2, MSH6, PMS2, EPCAM), Hereditary Diffuse Gastric Cancer (CDH1), Cowden syndrome (PTEN), Li Fraumeni syndrome (TP53), Peutz-Jeghers syndrome (STK11), MUTYH Associated Polyposis (MUTYH), and Neurofibromatosis type 1 (NF1).    Risk-reducing salpingo-oophorectomy can be considered in women with mutations in BRIP1, RAD51C, or RAD51D. Breast and/or other cancer risk management guidelines are available for ANGY, CHEK2, PALB2, NF1, and NBN.  The remaining genes (BARD1, DICER1, MRE11A, RAD50, and SMARCA4) are associated with increased cancer risk and may allow us to make medical recommendations when mutations are identified.    Amilcar was provided with a detailed brochure from  [employer] explaining the OvaNext testing, including the genes and their associated cancer risks included in this test.  Consent was obtained and genetic testing for OvaNext was sent to [facility]. Turn around time: approximately 3-4weeks.    Medical Management: For Amilcar, we reviewed that the information from genetic testing may determine additional cancer screening for which Amilcar may qualify (such as earlier/more frequent colonoscopies, more frequent dermatologic exams, etc).  Breast screening options (such as mammogram and MRI imaging) is likely dependent on remaining breast tissue following top surgery. Genetic test results may also provide options for risk reducing surgeries Amilcar could consider (i.e. bilateral mastectomy).  These recommendations will be discussed in detail once genetic testing is completed.     Plan:  1) Today Amilcar elected to proceed with the OvaNext panel through [facility].  2) This information should be available in 3-4 weeks.  3) Amilcar would prefer a phone call once results are available.    Liliana Rosado MS, [facility]  Licensed Genetic Counselor  [phone]

## 2020-02-04 NOTE — PATIENT INSTRUCTIONS
Assessing Cancer Risk  Only about 5-10% of cancers are thought to be due to an inherited cancer susceptibility gene.    These families often have:    Several people with the same or related types of cancer    Cancers diagnosed at a young age (before age 50)    Individuals with more than one primary cancer    Multiple generations of the family affected with cancer    Some people may be candidates for genetic testing of more than one gene.  For these families, genetic testing using a cancer panel may be offered.  These panels will test different genes known to increase the risk for breast, ovarian, uterine, and/or other cancers. All of the genes discussed below have published clinical management guidelines for individuals who are found to carry a mutation. The purpose of this handout is to serve as a brief summary of the genes analyzed by the panels used to inquire about hereditary breast and gynecologic cancer:  ANGY, BRCA1, BRCA2, BRIP1, CDH1, CHEK2, MLH1, MSH2, MSH6, PMS2, EPCAM, PTEN, PALB2, RAD51C, RAD51D, and TP53.  ______________________________________________________________________________  Hereditary Breast and Ovarian Cancer Syndrome   (BRCA1 and BRCA2)  A single mutation in one of the copies of BRCA1 or BRCA2 increases the risk for breast and ovarian cancer, among others.  The risk for pancreatic cancer and melanoma may also be slightly increased in some families.  The chart below shows the chance that someone with a BRCA mutation would develop cancer in his or her lifetime1,2,3,4.        A person s ethnic background is also important to consider, as individuals of Ashkenazi Jehovah's witness ancestry have a higher chance of having a BRCA gene mutation.  There are three BRCA mutations that occur more frequently in this population.    Sheffield Syndrome   (MLH1, MSH2, MSH6, PMS2, and EPCAM)  Currently five genes are known to cause Sheffield Syndrome: MLH1, MSH2, MSH6, PMS2, and EPCAM.  A single mutation in one of the  Sheffield Syndrome genes increases the risk for colon, endometrial, ovarian, and stomach cancers.  Other cancers that occur less commonly in Sheffield Syndrome include urinary tract, skin, and brain cancers.  The chart below shows the chance that a person with Sheffield syndrome would develop cancer in his or her lifetime5.      *Cancer risk varies depending on Sheffield syndrome gene found    Cowden Syndrome   (PTEN)  Cowden syndrome is a hereditary condition that increases the risk for breast, thyroid, endometrial, colon, and kidney cancer.  Cowden syndrome is caused by a mutation in the PTEN gene.  A single mutation in one of the copies of PTEN causes Cowden syndrome and increases cancer risk.  The chart below shows the chance that someone with a PTEN mutation would develop cancer in their lifetime6,7.  Other benign features seen in some individuals with Cowden syndrome include benign skin lesions (facial papules, keratoses, lipomas), learning disability, autism, thyroid nodules, colon polyps, and larger head size.      *One recent study found breast cancer risk to be increased to 85%    Li-Fraumeni Syndrome   (TP53)  Li-Fraumeni Syndrome (LFS) is a cancer predisposition syndrome caused by a mutation in the TP53 gene. A single mutation in one of the copies of TP53 increases the risk for multiple cancers. Individuals with LFS are at an increased risk for developing cancer at a young age. The lifetime risk for development of a LFS-associated cancer is 50% by age 30 and 90% by age 60.   Core Cancers: Sarcomas, Breast, Brain, Lung, Leukemias/Lymphomas, Adrenocortical carcinomas  Other Cancers: Gastrointestinal, Thyroid, Skin, Genitourinary    Hereditary Diffuse Gastric Cancer   (CDH1)  Currently, one gene is known to cause hereditary diffuse gastric cancer (HDGC): CDH1.  Individuals with HDGC are at increased risk for diffuse gastric cancer and lobular breast cancer. Of people diagnosed with HDGC, 30-50% have a mutation in the CDH1  gene.  This suggests there are likely other genes that may cause HDGC that have not been identified yet.      Lifetime Cancer Risks    General Population HDGC    Diffuse Gastric  <1% ~80%   Breast 12% 39-52%         Additional Genes  ANGY  ANGY is a moderate-risk breast cancer gene. Women who have a mutation in ANGY can have between a 2-4 fold increased risk for breast cancer compared to the general population8. ANGY mutations have also been associated with increased risk for pancreatic cancer, however an estimate of this cancer risk is not well understood9. Individuals who inherit two ANGY mutations have a condition called ataxia-telangiectasia (AT).  This rare autosomal recessive condition affects the nervous system and immune system, and is associated with progressive cerebellar ataxia beginning in childhood.  Individuals with ataxia-telangiectasia often have a weakened immune system and have an increased risk for childhood cancers.    PALB2  Mutations in PALB2 have been shown to increase the risk of breast cancer up to 33-58% in some families; where individuals fall within this risk range is dependent upon family [id number]. PALB2 mutations have also been associated with increased risk for pancreatic cancer, although this risk has not been quantified yet.  Individuals who inherit two PALB2 mutations--one from their mother and one from their father--have a condition called Fanconi Anemia.  This rare autosomal recessive condition is associated with short stature, developmental delay, bone marrow failure, and increased risk for childhood cancers.    CHEK2   CHEK2 is a moderate-risk breast cancer gene.  Women who have a mutation in CHEK2 have around a 2-fold increased risk for breast cancer compared to the general population, and this risk may be higher depending upon family history.11,12,13 Mutations in CHEK2 have also been shown to increase the risk of a number of other cancers, including colon and prostate, however  these cancer risks are currently not well understood.    BRIP1, RAD51C and RAD51D  Mutations in BRIP1, RAD51C, and RAD51D have been shown to increase the risk of ovarian cancer and possibly female breast cancer as well14,15 .       Lifetime Cancer Risk    General Population BRIP1 RAD51C RAD51D   Ovarian 1-2% ~5-8% ~5-9% ~7-15%           Inheritance  All of the cancer syndromes reviewed above are inherited in an autosomal dominant pattern.  This means that if a parent has a mutation, each of his or her children will have a 50% chance of inheriting that same mutation.  Therefore, each child--male or female--would have a 50% chance of being at increased risk for developing cancer.      Image obtained from Genetics Home Reference, 2013     Mutations in some genes can occur de chriss, which means that a person s mutation occurred for the first time in them and was not inherited from a parent.  Now that they have the mutation, however, it can be passed on to future generations.    Genetic Testing  Genetic testing involves a blood test and will look at the genetic information in the ANGY, BRCA1, BRCA2, BRIP1, CDH1, CHEK2, MLH1, MSH2, MSH6, PMS2, EPCAM, PTEN, PALB2, RAD51C, RAD51D, and TP53 genes for any harmful mutations that are associated with increased cancer risk.  If possible, it is recommended that the person(s) who has had cancer be tested before other family members.  That person will give us the most useful information about whether or not a specific gene is associated with the cancer in the family.    Results  There are three possible results of genetic testing:    Positive--a harmful mutation was identified in one or more of the genes    Negative--no mutation was identified in any of the genes on this panel    Variant of unknown significance--a variation in one of the genes was identified, but it is unclear how this impacts cancer risk in the family    Advantages and Disadvantages   There are advantages and  disadvantages to genetic testing.    Advantages    May clarify your cancer risk    Can help you make medical decisions    May explain the cancers in your family    May give useful information to your family members (if you share your results)    Disadvantages    Possible negative emotional impact of learning about inherited cancer risk    Uncertainty in interpreting a negative test result in some situations    Possible genetic discrimination concerns (see below)    Genetic Information Nondiscrimination Act (DIONI)  DIONI is a federal law that protects individuals from health insurance or employment discrimination based on a genetic test result alone.  Although rare, there are currently no legal discrimination protections in terms of life insurance, long term care, or disability insurances.  Visit the [employer] [place] website to learn more.    Reducing Cancer Risk  All of the genes described above have nationally recognized cancer screening guidelines that would be recommended for individuals who test positive.  In addition to increased cancer screening, surgeries may be offered or recommended to reduce cancer risk.  Recommendations are based upon an individual s genetic test result as well as their personal and family history of cancer.    Questions to Think About Regarding Genetic Testing:    What effect will the test result have on me and my relationship with my family members if I have an inherited gene mutation?  If I don t have a gene mutation?    Should I share my test results, and how will my family react to this news, which may also affect them?    Are my children ready to learn new information that may one day affect their own health?    Hereditary Cancer Resources    FORCE: Facing Our Risk of Cancer Empowered facingourrisk.org   Bright Pink bebrightpink.org   Li-Fraumeni Syndrome Association lfsassociation.org   PTEN World PTENworld.com   No stomach for cancer, Inc.  nostomachforcancer.org   Stomach cancer relief network Scrnet.org   Collaborative Group of the Americas on Inherited Colorectal Cancer (CGA) cgaicc.com    Cancer Care cancercare.org   American Cancer Society (ACS) cancer.org   National Cancer [place] (NCI) cancer.gov     Please call us if you have any questions or concerns.   Cancer Risk Management Program 2-139-5-[facility]-CANCER ([phone])  ? Robert Núñez, MS, [facility] [phone]  ? Denice Gould, MS, [facility]  [phone]  ? Liliana Rosado, MS, [facility]  [phone]  ? Kirti Posada, MS, [facility] [phone]  ? Charmaine Astrid, MS, [facility] [phone]  ? Sandy Metz, MS, [facility]  [phone]  ? Gregoria Oden, MS, [facility]  [phone]    References  1. Jama BUSTAMANTE, Jaden PDP, Zeina S, Kendall SILVA, Tati JE, Leroy JL, Modesto N, Rebeca H, Mingo O, Jhonathan A, Lisandroini B, Radinikko P, Mansandrokibill S, Yvette DM, Tejada N, Gabriella E, Claudia H, Shane E, Ismael J, Gronpao J, Betsy B, Lillyus H, Thorlacius S, Eerola H, Nevbilllinna H, Milly K, Alejandra OP. Average risks of breast and ovarian cancer associated with BRCA1 or BRCA2 mutations detected in case series unselected for family history: a combined analysis of 222 studies. Am J Hum Genevieve. 2003;72:1117-30.  2. Fadumo N, Carrie M, Damon G.  BRCA1 and BRCA2 Hereditary Breast and Ovarian Cancer. Gene Reviews online. 2013.  3. Jose J YC, Cele S, Rafita G, Jernigan S. Breast cancer risk among male BRCA1 and BRCA2 mutation carriers. J Natl Cancer Inst. 2007;99:1811-4.  4. Marck PEACE, Christal I, Tay J, Varinder E, Sav ER, Say F. Risk of breast cancer in male BRCA2 carriers. J Med Genevieve. 2010;47:710-1.  5. National Comprehensive Cancer Network. Clinical practice guidelines in oncology, colorectal cancer screening. Available online (registration required). 2015.  6. Jovanni CROOKS, Sheridan J, Dinora J, Andie LA, Lorenzo MEYER, Megan C. Lifetime cancer risks in individuals with germline PTEN mutations. Clin Cancer Res. 2012;18:400-7.  7. Pilarski R. Cowden  Syndrome: A Critical Review of the Clinical Literature. J Genevieve . 2009:18:13-27.  8. Jovana A, Harman D, Gera S, Anamika P, Mason T, Shona M, Dariel B, Raf H, Baljit R, Buster K, Deanna L, Marck DG, Yvette D, Evan DF, Maribel MR, The Breast Cancer Susceptibility Collaboration (UK) & Rm CAMEJO. ANGY mutations that cause ataxia-telangiectasia are breast cancer susceptibility alleles. Nature Genetics. 2006;38:873-875  9. Mulugeta N , Jorge Y, Roxana J, Chichi L, Bautista GM , Percy ML, Gallinger S, Vinson AG, Syngal S, Rudy ML, Dori J , Gerald R, Charmaine SZ, Jin JR, Marlena VE, Ana María M, Voshakira B, Dayanara N, Bradley RH, Freda KW, and Vincent AP. ANGY mutations in patients with hereditary pancreatic cancer. Cancer Discover. 2012;2:41-46  10. Jama SAUER, et al. Breast-Cancer Risk in Families with Mutations in PALB2. NEJM. 2014; 371(6):497-506.  11. CHEK2 Breast Cancer Case-Control Consortium. CHEK2*1100delC and susceptibility to breast cancer: A collaborative analysis involving 10,860 breast cancer cases and 9,065 controls from 10 studies. Am J Hum Genevieve, 74 (2004), pp. 7742-6121  12. Hayde T, Alvaro S, Rubén K, et al. Spectrum of Mutations in BRCA1, BRCA2, CHEK2, and TP53 in Families at High Risk of Breast Cancer. ASHLEY. 2006;295(12):9539-9076.   13. Mansi C, Delmar D, John A, et al. Risk of breast cancer in women with a CHEK2 mutation with and without a family history of breast cancer. J Clin Oncol. 2011;29:0415-2293.  14. Parvez H, Ariel E, Dixon SJ, et al. Contribution of germline mutations in the RAD51B, RAD51C, and RAD51D genes to ovarian cancer in the population. J Clin Oncol. 2015;33(26):6533-5064. Doi:10.1200/JCO.2015.61.2408.  15. Agustín T, Mallorie CARDONA, Blaise P, et al. Mutations in BRIP1 confer high risk of ovarian cancer. Janice Genevieve. 2011;43(11):3688-7054. doi:10.1038/ng.955.

## 2020-02-17 ENCOUNTER — TELEPHONE (OUTPATIENT)
Dept: ONCOLOGY | Facility: CLINIC | Age: 25
End: 2020-02-17

## 2020-02-17 NOTE — LETTER
Cancer Risk Management  Program [facility]  [facility]  [facility]  Mailing Address  Cancer Risk Management Program  [facility]  [address]  [place] [address]    New patient appointments  [phone]  February 25, 2020    Amilcar Cope  [address] [place] [facility] [address]      Dear Amilcar,    It was a pleasure speaking with you on the phone on 2/17/2020. Here is a copy of the progress note from our discussion. If you have any additional questions, please feel free to call.    Referring Provider: Yuli Awad PA-C    Presenting Information:  I spoke to Amilcar by phone today to discuss his genetic testing results. His blood was drawn on 2/4/2020. OvaNext was ordered from [facility]. This testing was done because of Amilcar's family history of breast cancer.    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 Given the uncertain significance of this result, medical management decisions should NOT be made based on this test result alone.    No known clinically actionable alterations were detected. Amilcar tested negative for mutations in the following genes: ANGY, BARD1,BRCA1, BRCA2, BRIP1, CDH1, CHEK2, DICER1, MLH1, MRE11A, MSH2, MSH6, MUTYH, NBN, NF1, PALB2, PMS2, PTEN, RAD50,RAD51C, RAD51D, SMARCA4, STK11 and TP53 (sequencing and deletion/duplication); EPCAM (deletion/duplication only).     Interpretation:  We discussed several different interpretations of this inconclusive test result. It is not clear if this variant in the BARD1 gene is associated with increased cancer risk.  1. This variant may be a benign change that does not increase cancer risk.  2. This  variant may be a harmful mutation, which has been associated with an increased risk for breast and possibly ovarian cancer.  However, specific risks are not yet determined for individuals who carry one pathogenic/likely pathogenic BARD1 variant, as current evidence is preliminary.      The laboratory is working to determine if this variant is harmful or benign, and they will contact me if it is reclassified. If this variant is determined to be a benign change, there may be a different gene or combination of genes and environment that are associated with the cancers in this family.  It is also important to consider that there may have been a gene mutation in his family, in which Amilcar did not inherit.    Inheritance:  We reviewed the autosomal dominant inheritance of this variant in the BARD1 gene.  Because it is unclear what, if any, risk is associated with this variant, clinical genetic testing for this BARD1 variant alone is not recommended for relatives.    Family Studies:  The laboratory may offer additional research based testing for specific relatives in order to help reclassify this variant.    Screening:  Based on this inconclusive test result, it is important for mAilcar and his relatives to refer back to the family history for appropriate cancer screening.      Amilcar likely remains at increased risk for breast cancer based on his family history. Breast cancer screening is generally recommended to begin approximately 10 years younger than the earliest age of breast cancer diagnosis in the family, or at age 40, whichever comes first. In this family, screening may begin at age 40. Breast screening options should be discussed with an individual's primary care provider and a genetic counselor, to determine at what age to begin screening, what screening is appropriate, and if additional screening (such as breast MRI) is necessary based on personal/family history factors.  Future breast screening recommendations  for Amilcar is likely dependent on remaining breast tissue following top surgery, which he is encouraged to discuss with his managing physicians.      Other population cancer screening options, such as those recommended by the American Cancer Society and the National Comprehensive Cancer Network (NCCN), are also appropriate for Amilcar at this time. These screening recommendations may change if there are changes to Amilcar's personal and/or family history of cancer. Final screening recommendations should be made by each individual's managing physician.    Additional Testing Considerations:  Although Amilcar's genetic testing result is inconclusive, other relatives may still carry a harmful cancer susceptibility gene, especially given his paternal family history of male breast cancer, several relatives diagnosed with female breast cancer, and Ashkenazi Hoahaoism Ancestry.  His maternal family history is also significant for breast and ovarian cancer. Genetic counseling is recommended for these relatives to discuss genetic testing options. If any of these relatives do pursue genetic testing, Amilcar is encouraged to contact me so that we may review the impact of their test results on him.    Summary:  We do not have an explanation for Amilcar's family history of cancer. While no genetic changes were identified, Amilcar may still be at risk for certain cancers due to family history, environmental factors, or other genetic causes not identified by this test.  Because of that, it is important that he continue with cancer screening based on his personal and family history as discussed above.      Genetic testing is rapidly advancing, and new cancer susceptibility genes will most likely be identified in the future.  Therefore, I encouraged Amilcar to contact me annually or if there are changes in his personal or family history.  This may change how we assess his cancer risk, screening, and the testing we would offer.    Plan:  1.   Amilcar will receive a copy of this letter with his test results.    2. He plans to follow-up with his medical providers.  3. He should contact me if his family history changes.  4. I will contact Amilcar if the laboratory informs me that this VUS has been reclassified.  This may change screening and testing recommendations for Amilcar and his relatives.    If Amilcar has any further questions, I encouraged him to contact me at [phone].    Liliana Rosado MS, [facility]  Licensed Genetic Counselor  [phone]

## 2020-02-17 NOTE — Clinical Note
Please print and send a copy of this letter and the patient's genetic testing report to the patient.    Please enclose test report: OvaNext [[id number]] (Order [id number])

## 2020-02-17 NOTE — TELEPHONE ENCOUNTER
"2/17/2020    Referring Provider: Yuli Awad PA-C    Presenting Information:  I spoke to Amilcar by phone today to discuss his genetic testing results. His blood was drawn on 2/4/2020. OvaNext was ordered from [facility]. This testing was done because of Amilcar's family history of breast cancer.    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 Given the uncertain significance of this result, medical management decisions should NOT be made based on this test result alone.    No known clinically actionable alterations were detected. Amilcar tested negative for mutations in the following genes: ANGY, BARD1,BRCA1, BRCA2, BRIP1, CDH1, CHEK2, DICER1, MLH1, MRE11A, MSH2, MSH6, MUTYH, NBN, NF1, PALB2, PMS2, PTEN, RAD50,RAD51C, RAD51D, SMARCA4, STK11 and TP53 (sequencing and deletion/duplication); EPCAM (deletion/duplication only).     A copy of the test report can be found in the Laboratory tab, dated 2/4/2020, and named \"OvaNext\". The report is scanned in as a linked document.    Interpretation:  We discussed several different interpretations of this inconclusive test result. It is not clear if this variant in the BARD1 gene is associated with increased cancer risk.  1. This variant may be a benign change that does not increase cancer risk.  2. This variant may be a harmful mutation, which has been associated with an increased risk for breast and possibly ovarian cancer.  However, specific risks are not yet determined for individuals who carry one pathogenic/likely pathogenic BARD1 variant, as current evidence is preliminary.      The laboratory is working to determine if this variant is harmful or benign, and they will contact me if it is reclassified. If this variant is determined to be a benign change, there may be a different gene or combination of genes and environment that " are associated with the cancers in this family.  It is also important to consider that there may have been a gene mutation in his family, in which Amilcar did not inherit.    Inheritance:  We reviewed the autosomal dominant inheritance of this variant in the BARD1 gene.  Because it is unclear what, if any, risk is associated with this variant, clinical genetic testing for this BARD1 variant alone is not recommended for relatives.    Family Studies:  The laboratory may offer additional research based testing for specific relatives in order to help reclassify this variant.    Screening:  Based on this inconclusive test result, it is important for Amilcar and his relatives to refer back to the family history for appropriate cancer screening.      Amilcar likely remains at increased risk for breast cancer based on his family history. Breast cancer screening is generally recommended to begin approximately 10 years younger than the earliest age of breast cancer diagnosis in the family, or at age 40, whichever comes first. In this family, screening may begin at age 40. Breast screening options should be discussed with an individual's primary care provider and a genetic counselor, to determine at what age to begin screening, what screening is appropriate, and if additional screening (such as breast MRI) is necessary based on personal/family history factors.  Future breast screening recommendations for Amilcar is likely dependent on remaining breast tissue following top surgery, which he is encouraged to discuss with his managing physicians.      Other population cancer screening options, such as those recommended by the American Cancer Society and the National Comprehensive Cancer Network (NCCN), are also appropriate for Amilcar at this time. These screening recommendations may change if there are changes to Amilcar's personal and/or family history of cancer. Final screening recommendations should be made by each individual's  managing physician.    Additional Testing Considerations:  Although Amilcar's genetic testing result is inconclusive, other relatives may still carry a harmful cancer susceptibility gene, especially given his paternal family history of male breast cancer, several relatives diagnosed with female breast cancer, and Ashkenazi Zoroastrianism Ancestry.  His maternal family history is also significant for breast and ovarian cancer. Genetic counseling is recommended for these relatives to discuss genetic testing options. If any of these relatives do pursue genetic testing, Amilcar is encouraged to contact me so that we may review the impact of their test results on him.    Summary:  We do not have an explanation for Amilcar's family history of cancer. While no genetic changes were identified, Amilcar may still be at risk for certain cancers due to family history, environmental factors, or other genetic causes not identified by this test.  Because of that, it is important that he continue with cancer screening based on his personal and family history as discussed above.      Genetic testing is rapidly advancing, and new cancer susceptibility genes will most likely be identified in the future.  Therefore, I encouraged Amilcar to contact me annually or if there are changes in his personal or family history.  This may change how we assess his cancer risk, screening, and the testing we would offer.    Plan:  1.  Amilcar will receive a copy of this letter with his test results.    2. He plans to follow-up with his medical providers.  3. He should contact me if his family history changes.  4. I will contact Amilcar if the laboratory informs me that this VUS has been reclassified.  This may change screening and testing recommendations for Amilcar and his relatives.    If Amilcar has any further questions, I encouraged him to contact me at [phone].      Time spent over phone: 5 minutes    Liliana Rosado MS, [facility]  Licensed Genetic  Counselor  [phone]

## 2020-02-18 ENCOUNTER — PATIENT OUTREACH (OUTPATIENT)
Dept: PLASTIC SURGERY | Facility: CLINIC | Age: 25
End: 2020-02-18

## 2020-02-18 NOTE — PROGRESS NOTES
[facility]:  Care Coordination Note     SITUATION   Amilcar Cope is a 24 year old adult who is receiving support for:  Gender Dysphoria.    BACKGROUND     Pt attended new top consult with Dr. Pratt.    ASSESSMENT     Surgery              CGC Assessment  Comprehensive Gender Care (CGC) Enrollment: Enrolled  Patient has a therapist: Yes  Name of therapist: Alesha Gardner at [facility]  Letter of support #1: (11/19/19)  Surgery being considered: Yes  Mastectomy: Yes  Mammogram completed: Yes  Addendum by Skyler Quiros MD on 11/13/2019  3:24 PM  ** ADDENDUM #1 **  IMPRESSION: Addendum:     Breast density: Scattered fibroglandular elements.   Result Impression   : NEGATIVE  There is no mammographic evidence of malignancy.      RECOMMENDATION:         - A clinical follow-up for both breasts.       - A clinical correlation for both breasts.    BI-RADS: Overall: 1 - Negative    The patient will be notified of the results.    REPORT SIGNED BY Skyler Quiros MD   Other Result Information   This result has an attachment that is not available.   Result Narrative   EXAM: MAMMO DIGITAL SCREENING BI    REASON FOR EXAM:  Routine screening mammogram    COMPARISON:  No comparisons were made when reading this study.    TECHNIQUE:  Craniocaudal and oblique digital views were obtained.  Current study was   evaluated with Computer Aided Detection (CAD) system.    FINDINGS:   No breast composition recorded.  No significant masses, calcifications, or   other findings are seen in either breast.  This is a baseline mammogram.     Other Result Information   Skyler Quiros MD - 11/13/2019  2:47 PM CST  EXAM: MAMMO DIGITAL SCREENING BI    REASON FOR EXAM:  Routine screening mammogram    COMPARISON:  No comparisons were made when reading this study.    TECHNIQUE:  Craniocaudal and oblique digital views were obtained.  Current study was evaluated with Computer Aided Detection (CAD)  system.    FINDINGS:   No breast composition recorded.  No significant masses, calcifications, or other findings are seen in either breast.  This is a baseline mammogram.      IMPRESSION: NEGATIVE  There is no mammographic evidence of malignancy.      RECOMMENDATION:         - A clinical follow-up for both breasts.       - A clinical correlation for both breasts.    BI-RADS: Overall: 1 - Negative    The patient will be notified of the results.    REPORT SIGNED BY Skyler Santillan MD             PLAN     Nursing Interventions: LOS adequate for PA per trans care coordinator. Mammogram complete 11/13/19, results negative.   Pt attended consult with Yuli Awad PA-C for risk assessment due to family history of breast cancer. Recommendations included genetic counseling and annual mammograms.   Pt attended consult with Liliana Rosado MS, [facility] for genetic counseling/ recommendation to complete Ova Next Panel. 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     Follow-up plan:  Reviewed above with Dr. Pratt. PA approved on 1/9/20 date span 1/7/20-7/4/2020.       Emiliana Woods RN

## 2020-02-21 LAB — LAB SCANNED RESULT: NORMAL

## 2020-03-03 DIAGNOSIS — F64.0 GENDER DYSPHORIA IN ADOLESCENT AND ADULT: Primary | ICD-10-CM

## 2020-03-03 RX ORDER — CEFAZOLIN SODIUM 2 G/50ML
2 SOLUTION INTRAVENOUS
Status: CANCELLED | OUTPATIENT
Start: 2020-03-03

## 2020-03-03 RX ORDER — CEFAZOLIN SODIUM 1 G/50ML
1 INJECTION, SOLUTION INTRAVENOUS SEE ADMIN INSTRUCTIONS
Status: CANCELLED | OUTPATIENT
Start: 2020-03-03

## 2020-03-04 ENCOUNTER — TELEPHONE (OUTPATIENT)
Dept: SURGERY | Facility: CLINIC | Age: 25
End: 2020-03-04

## 2020-03-04 PROBLEM — F64.0 GENDER DYSPHORIA IN ADOLESCENT AND ADULT: Status: ACTIVE | Noted: 2020-03-04

## 2020-03-04 NOTE — TELEPHONE ENCOUNTER
Surgery is scheduled with Dr. Pratt on 4/29 at [place].  Scheduled per pt.    H&P: to be completed by PCP.  Consult: 3/10 at 4 PM  POST-OP: 5/5 at 10 AM     The RN completed the education regarding the surgery.     The surgery packet was provided that contains surgical instructions and a map.     They are aware that they will receive a call  ~2 days prior to the scheduled procedure and will be given an exact arrival/start time.    I contacted the patient and WAS ABLE to confirm the scheduled dates.

## 2020-03-10 ENCOUNTER — OFFICE VISIT (OUTPATIENT)
Dept: PLASTIC SURGERY | Facility: CLINIC | Age: 25
End: 2020-03-10
Payer: COMMERCIAL

## 2020-03-10 DIAGNOSIS — F64.0 GENDER DYSPHORIA IN ADOLESCENT AND ADULT: Primary | ICD-10-CM

## 2020-03-10 ASSESSMENT — PAIN SCALES - GENERAL: PAINLEVEL: NO PAIN (0)

## 2020-03-10 NOTE — NURSING NOTE
Chief Complaint   Patient presents with     Pre-Op Exam     Pt here for pre op for top surgery       There were no vitals filed for this visit.    There is no height or weight on file to calculate BMI.      JOSE Cardozo NREMT                    No vitals taken per provider

## 2020-03-10 NOTE — PROGRESS NOTES
PLASTICS TOP PRE-OP  Wiley is a 24 year old year old biological female transitioning to male who presents for his pre-op visit prior to bilateral simple mastectomy with nipple graft reconstruction scheduled for 4/29/2020. He is here today with his mother and a friend. The patient has had a negative mammogram on 11/13/19 at [facility] in [place], and his letter of support has been received from Alesha Gardner at [facility] in [place]. History and physical are presumably scheduled for 4/17. Wiley met with a genetic counselor at the [facility] on 2/4 to discuss his family history of breast cancer and those implications on his upcoming top surgery - was found to have a mutation with no proven link to breast cancer. NG's OK'd.     My LPN, Kendra, discussed periop instructions with the patient including: not eating anything 8 hours prior to surgery, drinking clear liquids up to 2 hours before surgery except for morning medications with a sip of water, the preop shower with surgical soap, and wearing a button- or zip-up shirt on the day of surgery. She also instructed the patient to not take any NSAIDs the week prior to surgery, but he may take Tylenol post-op for pain as needed. She also gave the patient a packet with information on xuan-op topics, including where the surgery will be.     I discussed the following with the patient; preop, intraop and postop phases of care on the day of surgery, the placement of a bladder catheter during surgery that will likely be removed in recovery, postop cares and limitations with relation to home and work settings, 5-lb weight restriction for the first 3 weeks postop, and how long to maintain limited activities. We also discussed Zofran, oxycodone, Z-shady, and antipruritics which will be prescribed. We discussed that preventing constipation will be his responsibility, and we discussed methods such as aloe, prune juice or Miralax.      In addition, we went over the possible risks and complications involved with this elective procedure. These include but are not limited to: infection, bleeding, hematoma/seroma formation, poor healing - including dehiscence, nipple graft loss, hypertrophic scarring. Altered chest sensation - either hypo or hypersensitive, residual deformities and asymmetries, possible further surgical revisions, possible injury to surrounding neurovascular and musculoskeletal structures, including intra-axillary or intra-thoracic, anesthetic risks such as DVT/PE or cardiopulmonary events.      All questions were answered. Wiley will bring any other questions that arise to the day of surgery.    Total time = 20 minutes, all spent educating about upcoming procedure.    This note was prepared on behalf of Jenn Pratt MD by Flaquita Clarke, a trained medical scribe, based on my observations and the provider's statements to me.

## 2020-03-10 NOTE — PATIENT INSTRUCTIONS
Bilateral Mastectomy   Pre and Post op Surgery Instructions    You are scheduled for Bilateral mastectomy with nipple grafts on 4/29/2020 at 1:00pm    You will need to arrive at 11:00am [place] [address]. You can park in the Green Lot and check in on 3rd floor. (See attached map).     - Please make sure you have someone to drive you home after your surgery and you will need someone to stay with you at home 24 hours after your surgery.    The surgery will be about 3-4 hours long. You will be in recovery afterwards for about 1-2 hours.     Before Surgery:  - 8 hours prior to surgery stop eating solid food. You can continue to drink clear liquids (water, apple juice, Gatorade) up to 2 hours before surgery. If you have any medications that need to be taken in this timeframe, you can take them with a small sip of water    - No Ibuprofen, Advil, Aleve, Naproxen, Motrin, Aspirin for 7 days prior to surgery. If you are having pain in the week before surgery Tylenol is okay to take.    - Wear or bring a button up or zip up shirt with you to the hospital.    - Wash with surgical soap:      Showering with an antiseptic soap prior to an invasive procedure will decrease the  bacteria that is normally found on the skin.     Using 1/2 of the bottle for each application.  Be sure to use the whole bottle before coming to surgery.    The evening before surgery, shower or bathe as you normally would, using your preferred soap.  Give special attention to areas where the incisions will be made. Rinse thoroughly.  You may wash your hair with your regular shampoo.     Next wash your entire body, from the chin down, with the special antiseptic soap (full strength) using a freshly laundered clean washcloth, again giving special attention to areas where incisions will be made.    Rinse thoroughly and dry off using a freshly laundered clean towel.     Wear clean  clothes/pajamas and sleep on clean sheets    Repeat steps 2 and 3 in the morning before coming to surgery (using the rest of the bottle of soap).     **If you have a reaction to the surgical soap, let the nurse know.     Events of day:     -Check in on the 3rd floor of the hospital for your surgery.    Pre-op     - After you check in, you will meet with a pre-op nurse. They will go over your medications, review your H&P (pre-op physical), have you change into a paper gown, and your chest will be wiped again with soap.    - They will place compression boots on both legs to help decrease risk of blood clots.     - You may be asked to complete a pregnancy test. If you have had no exposure to sperm, you can decline.    - You will meet with the anesthesiologists and nurse anesthetist who will be keeping you asleep during surgery, they will be placing an IV, and will be monitoring you throughout the surgery.    - You will meet with the RN as you are being moved into operating room, they will be helping to position you and keep you comfortable during the surgery.     - Dr. Pratt will meet you in the pre-op area to discuss any questions about surgery, make markings on your chest for planning, and to sign your consent.     Intra-op (OR)    - A catheter will be placed in your bladder after you are sleeping and is typically removed before you wake up. The longest this would typically be in is in the post-op recovery room if needed.     - There will be straps and pillows to help position you and keep you in place during the surgery.    - The tissue that is removed will be sent to pathology to be analyzed and then discarded.     - ZULEIKA drains will be placed (one in each armpit) and a pain catheter will be placed in the center of your chest.     - Closure is done with dissolvable sutures under the skin and is then sealed with glue, and tape.    Post-op/Recovery    - You can usually go home the same day so long as you are eating,  "drinking, voiding, and pain is well controlled.  You will be sent home with all needed supplies.     - Post-Op medications you will be given in addition to the anesthesia include: Zofran (nausea), Oxycodone (pain), Z-shady (infection), and antipruritic (itching).     - You will leave the hospitals with an ace bandage wrapped around your chest for compression. You may keep the ace bandage on until you come back for your one week post op visit or you may adjust the wrap as needed if it is either too tight or too loose.     Post-Op Cares:     - No lifting over 5 lbs for 3 weeks after surgery    - No stretching arms out or above the head (\"modified T-dawson\")    - Walk around frequently to help prevent blood clots    - Do deep breathing exercises to prevent pneumonia    - No sleeping on stomach x 3 weeks after surgery, if it is difficult not to roll over onto your stomach you can sleep in a recliner or use additional pillows    - Do not use any ice packs or heat packs    - Preventing constipation will be your responsibility. You can use whatever method works best for you such as; aloe, prune juice, Sennokot or Miralax.    - No showering in the first week after your surgery.     What to expect at your 1st post op visit:    When you come in for your 1st post op visit, we will assess the output of your drains and remove the pain catheter (On-Q) that was placed on your chest.     -Please remember to bring the documentations of your output with you to your appointment so we can review how much your drains have been putting out since your surgery.     -We will remove the bolsters that was placed on your nipples and teach you how to perform nipple graft dressings.     -You will be given supplies to take home and will need to continue wearing the ace wrap compression for another week after the drains are removed.     Nipple Care:   Change nipple dressings daily.  Take dressings off prior to showering and reapply after showering.  No " direct shower spray on nipple grafts for 4 weeks.     Cut vaseline gauze to nipple size and place over nipple.  Place folded white gauze over vaseline gauze and cover with plastic dressing.  Do dressing changes for 1 more week.  If you continue to have drainage, continue the dressings until drainage stops.       Drain Care:  You will be discharged with [place] (ZULEIKA) drainage tubes.  These tubes drain fluid from your incision, helping to prevent swelling and reducing the risk for infection.  The tube is held in place by a few stitches. Pin your ZULEIKA drain to your clothing by using a safety pin through the plastic loop on the top of the bulb. If the drain is not attached to your clothing, it may pull out from under your skin. Drains are uncomfortable!    Emptying the drain bulb: The measuring cup provided by the hospital or a measuring cup that is used only for the ZULEIKA drain.  1. Wash your hands with soap and water.  2. Hold the bulb securely.  3. Remove the drainage plug from the emptying port.  4. Carefully turn the bulb upside down over the measuring cup, and gently squeeze all of the drainage into the measuring cup.  5. Squeeze the middle of the bulb firmly so that the bulb is as flat as possible.                                                                                                                                                    6. While still squeezing the bulb, replace the drainage plug. This step is important to keep the drain suction  7. Measure how much fluid you removed from the bulb.  8. Write down the amount and color of the fluid you removed from the bulb. If  you have more than one drain, keep a separate record for each one.  9. Empty the fluid into the toilet and flush.  10. Rinse the measuring cup, and wash your hands with soap and water.  11. You should empty the drain at least two times each day, in the morning and at bedtime.  12. Drainage tubes are removed when the output is less  than 20ml in a 24 hour period for 2 consecutive days.  13. Output will be somewhere between 30 to 50 mLs per day, but don't be alarmed if it is more or less. Output may not be equal between sides. Amounts will probably decrease over time.  14. The color coming from the drains may vary from deep red, light pink, or clear yellow.    Stripping the tube:  To prevent clots from blocking the drain, you will need to  strip  it. Stripping means that you use your fingers to squeeze along the length of the drain to help maintain the flow of drainage.    Wash your hands.    Using one hand, firmly hold the tubing near the insertion site (as close to your skin as the ace wrap and gauze dressing will allow). This will prevent the drain from being pulled out while you are stripping it and from pulling on skin and sutures.    [place] upper/top fingers and milk with the bottom or lower fingers.    Using your index finger and thumb of the other hand, squeeze the tubing below the  first hand. You should squeeze it firmly enough so the tubing becomes flat.     Maintain pressure on the tube, as you are squeezing, slide your index finger and thumb down the tube about 4-6 inches toward the bulb. (use alcohol wipes or lotion to help).    Then, release the hand closest to where the tube is attached to the body (making sure to keep pressure with the other hand), and move upper/anchor hand down. Squeeze, Repeat in segments.    Do not release the pressure you are creating in the tubing until you reach the bulb.  The whole tube will be flat.     If at any time it feels like the tube is pulling away from the skin or starts to hurt STOP! Then start over again more gently.     Strip the drain each time you empty it.

## 2020-03-10 NOTE — LETTER
3/10/2020       RE: Amilcar Cope  [address] [facility] [address]     Dear Colleague,    Thank you for referring your patient, Amilcar Cope, to the [facility] PLASTIC AND RECONSTRUCTIVE SURGERY at [facility]. Please see a copy of my visit note below.    PLASTICS TOP PRE-OP  Wiley is a 24 year old year old biological female transitioning to male who presents for his pre-op visit prior to bilateral simple mastectomy with nipple graft reconstruction scheduled for 4/29/2020. He is here today with his mother and a friend. The patient has had a negative mammogram on 11/13/19 at [facility] in [place], and his letter of support has been received from Alesha Gardner at [facility] in [place]. History and physical are presumably scheduled for 4/17. 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 NG's OK'd.     My LPN, Kendra, discussed periop instructions with the patient including: not eating anything 8 hours prior to surgery, drinking clear liquids up to 2 hours before surgery except for morning medications with a sip of water, the preop shower with surgical soap, and wearing a button- or zip-up shirt on the day of surgery. She also instructed the patient to not take any NSAIDs the week prior to surgery, but he may take Tylenol post-op for pain as needed. She also gave the patient a packet with information on xuan-op topics, including where the surgery will be.     I discussed the following with the patient; preop, intraop and postop phases of care on the day of surgery, the placement of a bladder catheter during surgery that will likely be removed in recovery, postop cares and limitations with relation to home and work settings, 5-lb weight  restriction for the first 3 weeks postop, and how long to maintain limited activities. We also discussed Zofran, oxycodone, Z-shady, and antipruritics which will be prescribed. We discussed that preventing constipation will be his responsibility, and we discussed methods such as aloe, prune juice or Miralax.     In addition, we went over the possible risks and complications involved with this elective procedure. These include but are not limited to: infection, bleeding, hematoma/seroma formation, poor healing - including dehiscence, nipple graft loss, hypertrophic scarring. Altered chest sensation - either hypo or hypersensitive, residual deformities and asymmetries, possible further surgical revisions, possible injury to surrounding neurovascular and musculoskeletal structures, including intra-axillary or intra-thoracic, anesthetic risks such as DVT/PE or cardiopulmonary events.      All questions were answered. Wiley will bring any other questions that arise to the day of surgery.    Total time = 20 minutes, all spent educating about upcoming procedure.    This note was prepared on behalf of Jenn Pratt MD by Flaquita Clarke, a trained medical scribe, based on my observations and the provider's statements to me.     Again, thank you for allowing me to participate in the care of your patient.      Sincerely,    Jenn Pratt MD

## 2020-03-11 ENCOUNTER — HEALTH MAINTENANCE LETTER (OUTPATIENT)
Age: 25
End: 2020-03-11

## 2020-03-30 ENCOUNTER — TELEPHONE (OUTPATIENT)
Dept: SURGERY | Facility: CLINIC | Age: 25
End: 2020-03-30

## 2020-03-30 NOTE — TELEPHONE ENCOUNTER
Confirmed with Amilcar that we are cancelling surgery with Dr. Pratt on 4/29 due to the COVID-19 concerns. We are also cancelling any post-op appointment scheduled.     I stated that we will call when we are able to reschedule as we are not able to do so at this time.    Noted that I am not a nurse and cannot answer any medical questions regarding this and to contact the RN with medical questions.

## 2020-05-18 ENCOUNTER — TELEPHONE (OUTPATIENT)
Dept: SURGERY | Facility: CLINIC | Age: 25
End: 2020-05-18

## 2020-05-18 DIAGNOSIS — F64.0 GENDER DYSPHORIA IN ADOLESCENT AND ADULT: Primary | ICD-10-CM

## 2020-05-18 DIAGNOSIS — Z11.59 ENCOUNTER FOR SCREENING FOR OTHER VIRAL DISEASES: Primary | ICD-10-CM

## 2020-05-18 NOTE — TELEPHONE ENCOUNTER
Surgery is scheduled with Dr. Pratt on 8/3 at [place].  Scheduled per MD.    H&P: to be completed by PAC.  PAC: 7/28  Pre-op Consult: 7/28  POST-OP: 8/11    The RN completed the education regarding the surgery.     The surgery packet was provided that contains surgical instructions and a map.     They are aware that they will get their finalized times at their appointment with PAC.    I contacted the patient and spoke with him to confirm the scheduled dates.

## 2020-05-19 NOTE — TELEPHONE ENCOUNTER
FUTURE VISIT INFORMATION      SURGERY INFORMATION:    Date: 8/3/20    Location: ur or    Surgeon:  Jenn Pratt MD     Anesthesia Type:  general    Procedure: bilateral simple mastectomy,  nipple grafts. OnQ    Consult: ov 3/10    RECORDS REQUESTED FROM:       Primary Care Provider: Presley Rodas MD- [facility]    Most recent EKG+ Tracin/10/19- [facility]

## 2020-06-24 ENCOUNTER — TELEPHONE (OUTPATIENT)
Dept: SURGERY | Facility: CLINIC | Age: 25
End: 2020-06-24

## 2020-06-29 ENCOUNTER — TELEPHONE (OUTPATIENT)
Dept: SURGERY | Facility: CLINIC | Age: 25
End: 2020-06-29

## 2020-07-24 PROBLEM — J18.9 RECURRENT PNEUMONIA: Status: ACTIVE | Noted: 2017-05-29

## 2020-07-24 PROBLEM — F41.9 ANXIETY AND DEPRESSION: Status: ACTIVE | Noted: 2017-05-29

## 2020-07-24 PROBLEM — Z12.4 CERVICAL CANCER SCREENING: Status: ACTIVE | Noted: 2018-04-09

## 2020-07-24 PROBLEM — Z91.09 ENVIRONMENTAL ALLERGIES: Status: ACTIVE | Noted: 2017-05-29

## 2020-07-24 PROBLEM — Z78.9 FEMALE-TO-MALE TRANSGENDER PERSON: Status: ACTIVE | Noted: 2017-05-29

## 2020-07-24 PROBLEM — N95.2 VAGINAL ATROPHY: Status: ACTIVE | Noted: 2019-09-10

## 2020-07-24 PROBLEM — D80.0: Status: ACTIVE | Noted: 2017-05-29

## 2020-07-24 PROBLEM — L70.0 ACNE VULGARIS: Status: ACTIVE | Noted: 2019-09-10

## 2020-07-24 PROBLEM — F32.A ANXIETY AND DEPRESSION: Status: ACTIVE | Noted: 2017-05-29

## 2020-07-24 PROBLEM — E29.1 MALE HYPOGONADISM: Status: ACTIVE | Noted: 2019-02-28

## 2020-07-28 ENCOUNTER — PRE VISIT (OUTPATIENT)
Dept: SURGERY | Facility: CLINIC | Age: 25
End: 2020-07-28

## 2020-07-28 ENCOUNTER — OFFICE VISIT (OUTPATIENT)
Dept: PLASTIC SURGERY | Facility: CLINIC | Age: 25
End: 2020-07-28
Payer: COMMERCIAL

## 2020-07-28 ENCOUNTER — OFFICE VISIT (OUTPATIENT)
Dept: SURGERY | Facility: CLINIC | Age: 25
End: 2020-07-28
Payer: COMMERCIAL

## 2020-07-28 ENCOUNTER — ANESTHESIA EVENT (OUTPATIENT)
Dept: SURGERY | Facility: CLINIC | Age: 25
End: 2020-07-28
Payer: COMMERCIAL

## 2020-07-28 VITALS
HEIGHT: 66 IN | BODY MASS INDEX: 38.89 KG/M2 | RESPIRATION RATE: 14 BRPM | TEMPERATURE: 98.8 F | OXYGEN SATURATION: 96 % | DIASTOLIC BLOOD PRESSURE: 84 MMHG | WEIGHT: 242 LBS | HEART RATE: 85 BPM | SYSTOLIC BLOOD PRESSURE: 118 MMHG

## 2020-07-28 VITALS
TEMPERATURE: 98.6 F | BODY MASS INDEX: 38.56 KG/M2 | HEART RATE: 89 BPM | DIASTOLIC BLOOD PRESSURE: 86 MMHG | WEIGHT: 239.9 LBS | OXYGEN SATURATION: 96 % | SYSTOLIC BLOOD PRESSURE: 141 MMHG | HEIGHT: 66 IN

## 2020-07-28 DIAGNOSIS — Z01.818 PREOP EXAMINATION: ICD-10-CM

## 2020-07-28 DIAGNOSIS — F64.0 GENDER DYSPHORIA IN ADOLESCENT AND ADULT: Primary | ICD-10-CM

## 2020-07-28 DIAGNOSIS — F64.9 GENDER DYSPHORIA: ICD-10-CM

## 2020-07-28 DIAGNOSIS — Z01.818 PREOP EXAMINATION: Primary | ICD-10-CM

## 2020-07-28 LAB
ANION GAP SERPL CALCULATED.3IONS-SCNC: 6 MMOL/L (ref 3–14)
BUN SERPL-MCNC: 16 MG/DL (ref 7–30)
CALCIUM SERPL-MCNC: 9.7 MG/DL (ref 8.5–10.1)
CHLORIDE SERPL-SCNC: 99 MMOL/L (ref 94–109)
CO2 SERPL-SCNC: 26 MMOL/L (ref 20–32)
CREAT SERPL-MCNC: 1.12 MG/DL (ref 0.66–1.25)
ERYTHROCYTE [DISTWIDTH] IN BLOOD BY AUTOMATED COUNT: 12.9 % (ref 10–15)
GFR SERPL CREATININE-BSD FRML MDRD: >90 ML/MIN/{1.73_M2}
GLUCOSE SERPL-MCNC: 86 MG/DL (ref 70–99)
HCT VFR BLD AUTO: 57.9 % (ref 40–53)
HGB BLD-MCNC: 19.5 G/DL (ref 13.3–17.7)
MCH RBC QN AUTO: 30.2 PG (ref 26.5–33)
MCHC RBC AUTO-ENTMCNC: 33.7 G/DL (ref 31.5–36.5)
MCV RBC AUTO: 90 FL (ref 78–100)
PLATELET # BLD AUTO: 287 10E9/L (ref 150–450)
POTASSIUM SERPL-SCNC: 3.9 MMOL/L (ref 3.4–5.3)
RBC # BLD AUTO: 6.45 10E12/L (ref 4.4–5.9)
SODIUM SERPL-SCNC: 131 MMOL/L (ref 133–144)
WBC # BLD AUTO: 13.6 10E9/L (ref 4–11)

## 2020-07-28 RX ORDER — ESTRADIOL 0.1 MG/G
1 CREAM VAGINAL
COMMUNITY
Start: 2020-06-17

## 2020-07-28 RX ORDER — SCOLOPAMINE TRANSDERMAL SYSTEM 1 MG/1
1 PATCH, EXTENDED RELEASE TRANSDERMAL ONCE
Status: CANCELLED | OUTPATIENT
Start: 2020-07-28 | End: 2020-07-28

## 2020-07-28 ASSESSMENT — LIFESTYLE VARIABLES: TOBACCO_USE: 0

## 2020-07-28 ASSESSMENT — MIFFLIN-ST. JEOR
SCORE: 2025.61
SCORE: 2035.45

## 2020-07-28 ASSESSMENT — PAIN SCALES - GENERAL
PAINLEVEL: NO PAIN (0)
PAINLEVEL: NO PAIN (0)

## 2020-07-28 NOTE — PATIENT INSTRUCTIONS
Bilateral Mastectomy   Pre and Post op Surgery Instructions    You are scheduled for Bilateral Mastectomy with nipple grafts on 8/3/2020 at 7:30 am.    You will need to arrive at 5:30 [place] [address]. You can park in the Green Lot and check in on 3rd floor. (See attached map).     - Please make sure you have someone to drive you home after your surgery and you will need someone to stay with you at home 24 hours after your surgery.    The surgery will be about 3-4 hours long. You will be in recovery afterwards for about 1-2 hours.       Before Surgery:  - 8 hours prior to surgery stop eating solid food. You can continue to drink clear liquids (water, apple juice, Gatorade) up to 2 hours before surgery. If you have any medications that need to be taken in this timeframe, you can take them with a small sip of water    - No Ibuprofen, Advil, Aleve, Naproxen, Motrin, Aspirin for 7 days prior to surgery. If you are having pain in the week before surgery Tylenol is okay to take.    - Wear or bring a button up or zip up shirt with you to the hospital.    - Wash with surgical soap:      Showering with an antiseptic soap prior to an invasive procedure will decrease the  bacteria that is normally found on the skin.     Using 1/2 of the bottle for each application.  Be sure to use the whole bottle before coming to surgery.    The evening before surgery, shower or bathe as you normally would, using your preferred soap.  Give special attention to areas where the incisions will be made. Rinse thoroughly.  You may wash your hair with your regular shampoo.     Next wash your entire body, from the chin down, with the special antiseptic soap (full strength) using a freshly laundered clean washcloth, again giving special attention to areas where incisions will be made.    Rinse thoroughly and dry off using a freshly laundered clean towel.     Wear clean  clothes/pajamas and sleep on clean sheets    Repeat steps 2 and 3 in the morning before coming to surgery (using the rest of the bottle of soap).     **If you have a reaction to the surgical soap, let the nurse know.     Events of day:     -Check in on the 3rd floor of the hospital for your surgery.    Pre-op     - After you check in, you will meet with a pre-op nurse. They will go over your medications, review your H&P (pre-op physical), have you change into a paper gown, and your chest will be wiped again with soap.    - They will place compression boots on both legs to help decrease risk of blood clots.     - You may be asked to complete a pregnancy test. If you have had no exposure to sperm, you can decline.    - You will meet with the anesthesiologists and nurse anesthetist who will be keeping you asleep during surgery, they will be placing an IV, and will be monitoring you throughout the surgery.    - You will meet with the RN as you are being moved into operating room, they will be helping to position you and keep you comfortable during the surgery.     - Dr. Pratt will meet you in the pre-op area to discuss any questions about surgery, make markings on your chest for planning, and to sign your consent.     Intra-op (OR)    - A catheter will be placed in your bladder after you are sleeping and is typically removed before you wake up. The longest this would typically be in is in the post-op recovery room if needed.     - There will be straps and pillows to help position you and keep you in place during the surgery.    - The tissue that is removed will be sent to pathology to be analyzed and then discarded.     - ZULEIKA drains will be placed (one in each armpit) and a pain catheter will be placed in the center of your chest.     - Closure is done with dissolvable sutures under the skin and is then sealed with glue, and tape.    Post-op/Recovery    - You can usually go home the same day so long as you are eating,  "drinking, voiding, and pain is well controlled.  You will be sent home with all needed supplies.     - Post-Op medications you will be given in addition to the anesthesia include: Zofran (nausea), Oxycodone (pain), Z-shady (infection), and antipruritic (itching).     - You will leave the hospitals with an ace bandage wrapped around your chest for compression. You may keep the ace bandage on until you come back for your one week post op visit or you may adjust the wrap as needed if it is either too tight or too loose.     Post-Op Cares:     - No lifting over 5 lbs for 3 weeks after surgery    - No stretching arms out or above the head (\"modified T-dawson\")    - Walk around frequently to help prevent blood clots    - Do deep breathing exercises to prevent pneumonia    - No sleeping on stomach x 3 weeks after surgery, if it is difficult not to roll over onto your stomach you can sleep in a recliner or use additional pillows    - Do not use any ice packs or heat packs    - Preventing constipation will be your responsibility. You can use whatever method works best for you such as; aloe, prune juice, Sennokot or Miralax.    - No showering in the first week after your surgery.     What to expect at your 1st post op visit:    When you come in for your 1st post op visit, we will assess the output of your drains and remove the pain catheter (On-Q) that was placed on your chest.     -Please remember to bring the documentations of your output with you to your appointment so we can review how much your drains have been putting out since your surgery.     -We will remove the bolsters that was placed on your nipples and teach you how to perform nipple graft dressings.     -You will be given supplies to take home and will need to continue wearing the ace wrap compression for another week after the drains are removed.     Nipple Care:   Change nipple dressings daily.  Take dressings off prior to showering and reapply after showering.  No " direct shower spray on nipple grafts for 4 weeks.     Cut vaseline gauze to nipple size and place over nipple.  Place folded white gauze over vaseline gauze and cover with plastic dressing.  Do dressing changes for 1 more week.  If you continue to have drainage, continue the dressings until drainage stops.       Drain Care:  You will be discharged with Chuckie[place]Frank[place] (ZULEIKA) drainage tubes.  These tubes drain fluid from your incision, helping to prevent swelling and reducing the risk for infection.  The tube is held in place by a few stitches. Pin your ZULEIKA drain to your clothing by using a safety pin through the plastic loop on the top of the bulb. If the drain is not attached to your clothing, it may pull out from under your skin. Drains are uncomfortable!    Emptying the drain bulb: The measuring cup provided by the hospital or a measuring cup that is used only for the ZULEIKA drain.  1. Wash your hands with soap and water.  2. Hold the bulb securely.  3. Remove the drainage plug from the emptying port.  4. Carefully turn the bulb upside down over the measuring cup, and gently squeeze all of the drainage into the measuring cup.  5. Squeeze the middle of the bulb firmly so that the bulb is as flat as possible.                                                                                                                                                    6. While still squeezing the bulb, replace the drainage plug. This step is important to keep the drain suction  7. Measure how much fluid you removed from the bulb.  8. Write down the amount and color of the fluid you removed from the bulb. If  you have more than one drain, keep a separate record for each one.  9. Empty the fluid into the toilet and flush.  10. Rinse the measuring cup, and wash your hands with soap and water.  11. You should empty the drain at least two times each day, in the morning and at bedtime.  12. Drainage tubes are removed when the output is less  than 20ml in a 24 hour period for 2 consecutive days.  13. Output will be somewhere between 30 to 50 mLs per day, but don't be alarmed if it is more or less. Output may not be equal between sides. Amounts will probably decrease over time.  14. The color coming from the drains may vary from deep red, light pink, or clear yellow.    Stripping the tube:  To prevent clots from blocking the drain, you will need to  strip  it. Stripping means that you use your fingers to squeeze along the length of the drain to help maintain the flow of drainage.    Wash your hands.    Using one hand, firmly hold the tubing near the insertion site (as close to your skin as the ace wrap and gauze dressing will allow). This will prevent the drain from being pulled out while you are stripping it and from pulling on skin and sutures.    Hustler upper/top fingers and milk with the bottom or lower fingers.    Using your index finger and thumb of the other hand, squeeze the tubing below the  first hand. You should squeeze it firmly enough so the tubing becomes flat.     Maintain pressure on the tube, as you are squeezing, slide your index finger and thumb down the tube about 4-6 inches toward the bulb. (use alcohol wipes or lotion to help).    Then, release the hand closest to where the tube is attached to the body (making sure to keep pressure with the other hand), and move upper/anchor hand down. Squeeze, Repeat in segments.    Do not release the pressure you are creating in the tubing until you reach the bulb.  The whole tube will be flat.     If at any time it feels like the tube is pulling away from the skin or starts to hurt STOP! Then start over again more gently.     Strip the drain each time you empty it.

## 2020-07-28 NOTE — NURSING NOTE
"Chief Complaint   Patient presents with     Consult     Pre surgery consult.        Vitals:    07/28/20 1216   BP: (!) 141/86   BP Location: Left arm   Patient Position: Sitting   Cuff Size: Adult Large   Pulse: 89   Temp: 98.6  F (37  C)   TempSrc: Oral   SpO2: 96%   Weight: 108.8 kg (239 lb 14.4 oz)   Height: 1.676 m (5' 5.98\")       Body mass index is 38.74 kg/m .    Kendra Tristan LPN                     "

## 2020-07-28 NOTE — H&P
Pre-Operative H & P     CC:  Preoperative exam to assess for increased cardiopulmonary risk while undergoing surgery and anesthesia.    Date of Encounter: 7/28/2020  Primary Care Physician:  Presley Rodas  Amilcar Cope is a 24 year old adult who presents for pre-operative H & P in preparation for a  a bilateral simple mastectomy,  nipple grafts, OnQ on 8/3/2020 by Dr. Pratt at [facility].     Amilcar Cope is a 24 year old person with allergic rhinitis, hypogammaglobulinemia, obesity, depression and anxiety that has gender dysphoria.  He has been on testosterone for almost 4 years now and has been living his chosen gender identity role for about the same amount of time.  He intermittently binds.  He had a hysterectomy on 9/27/2019.  He has consulted with Dr. Pratt with interest in top surgery.  The above listed procedure was previously scheduled for this past spring, but was postponed until now due to covid restrictions.    History is obtained from the patient and the medical record.     Past Medical History  Past Medical History:   Diagnosis Date     Allergic rhinitis      Anxiety      Asthma     child     Depression      Hypogammaglobulinemia (H)     sinus infections     Obesity      PONV (postoperative nausea and vomiting)      Novice teeth extracted        Past Surgical History  Past Surgical History:   Procedure Laterality Date     DAVINCI HYSTERECTOMY TOTAL, OOPHORECTOMY BILATERAL Bilateral 9/27/2019    Procedure: Davinci Assisted Total Laparoscopic Hysterectomy, Bilateral Salpingo Oophoretomy,;  Surgeon: Emiliana Pillai MD;  Location: [facility] OR     TONSILLECTOMY  01/2020       Hx of Blood transfusions/reactions: none    Hx of abnormal bleeding or anti-platelet use: none    Menstrual history: Patient's last menstrual period was 06/27/2019 (approximate).:     Steroid use in the last year: weekly testosterone injections    Personal or FH with difficulty  with Anesthesia:  Patient has a history of PONV and being slow to wake.  +family history of mother with PONV and father being slow to wake.       Prior to Admission Medications  Current Outpatient Medications   Medication Sig Dispense Refill     cetirizine (ZYRTEC) 10 MG tablet Take 10 mg by mouth At Bedtime        Cholecalciferol (VITAMIN D) 2000 units CAPS Take by mouth At Bedtime        diphenhydrAMINE (BENADRYL) 50 MG capsule Take 50 mg by mouth At Bedtime       escitalopram (LEXAPRO) 10 MG tablet Take 10 mg by mouth At Bedtime        estradiol (ESTRACE) 0.1 MG/GM vaginal cream Place 1 Application vaginally       melatonin 5 MG tablet Take 10 mg by mouth At Bedtime        SUMAtriptan Succinate (IMITREX PO) Take 25 mg by mouth as needed        testosterone cypionate (DEPOTESTOSTERONE) 200 MG/ML injection Inject into the muscle once a week 0.45 mL once weekly.       Testosterone Cypionate 50 MG/ML SOLN Inject as directed once a week          Allergies  Allergies   Allergen Reactions     Sulfa Drugs Anaphylaxis     tachycardia       Social History  Social History     Socioeconomic History     Marital status: Single     Spouse name: Not on file     Number of children: 0     Years of education: Not on file     Highest education level: Not on file   Occupational History     Occupation: unemployed   Social Needs     Financial resource strain: Not on file     Food insecurity     Worry: Not on file     Inability: Not on file     Transportation needs     Medical: Not on file     Non-medical: Not on file   Tobacco Use     Smoking status: Never Smoker     Smokeless tobacco: Never Used   Substance and Sexual Activity     Alcohol use: Not Currently     Drug use: Never     Sexual activity: Yes     Partners: Male     Birth control/protection: Condom, I.U.D., Other   Lifestyle     Physical activity     Days per week: Not on file     Minutes per session: Not on file     Stress: Not on file   Relationships     Social connections      Talks on phone: Not on file     Gets together: Not on file     Attends Protestant service: Not on file     Active member of club or organization: Not on file     Attends meetings of clubs or organizations: Not on file     Relationship status: Not on file     Intimate partner violence     Fear of current or ex partner: Not on file     Emotionally abused: Not on file     Physically abused: Not on file     Forced sexual activity: Not on file   Other Topics Concern     Not on file   Social History Narrative     Not on file       Family History  Family History   Problem Relation Age of Onset     Immunodeficiency Father         as child     Coronary Artery Disease Father      Hypertension Father      Anxiety Disorder Father      Diabetes Father      Genetic Disorder Father         Autoimmune     Obesity Father      Asthma Father      Anesthesia Reaction Father         Issues waking up     Breast Cancer Paternal Grandfather      Coronary Artery Disease Paternal Grandfather      Hypertension Paternal Grandfather      Anxiety Disorder Paternal Grandfather      Asthma Paternal Grandfather      Uterine Cancer Paternal Grandmother      Other Cancer Paternal Grandmother      Anxiety Disorder Paternal Grandmother      Substance Abuse Paternal Grandmother      Other Cancer Maternal Grandfather      Coronary Artery Disease Maternal Grandfather      Hypertension Maternal Grandfather      Anxiety Disorder Maternal Grandfather      Substance Abuse Maternal Grandfather      Coronary Artery Disease Mother      Anxiety Disorder Mother      Obesity Mother      Asthma Mother         Acid reflux induced     Anesthesia Reaction Mother         Gets violently ill (PONV)     Coronary Artery Disease Maternal Grandmother      Anxiety Disorder Maternal Grandmother      Diabetes Maternal Grandmother      Obesity Maternal Grandmother              ROS/MED HX  The complete review of systems is negative other than noted in the HPI or here.  "  ENT/Pulmonary:     (+)NINOSKA risk factors obese, allergic rhinitis, , . .   (-) tobacco use, asthma and recent URI   Neurologic:     (+)migraines,     Cardiovascular:  - neg cardiovascular ROS   (+) ----. : . . . :. . Previous cardiac testing date:results:date: results:ECG reviewed date:9/2019 results:NSR date: results:          METS/Exercise Tolerance:  >4 METS   Hematologic:  - neg hematologic  ROS      (-) history of blood clots and History of Transfusion   Musculoskeletal:  - neg musculoskeletal ROS       GI/Hepatic:  - neg GI/hepatic ROS       Renal/Genitourinary:  - ROS Renal section negative       Endo:     (+) Chronic steroid usage (weekly testosterone injections) for Obesity, .      Psychiatric:     (+) psychiatric history anxiety and depression      Infectious Disease:   (+) Other Infectious Disease hypogammaglobulinemia- reports he was \"deemed cured\" after graduating high school     (-) Recent Fever   Malignancy:      - no malignancy   Other:    (+) No chance of pregnancy no H/O Chronic Pain,                       PHYSICAL EXAM:   Mental Status/Neuro: A/A/O; Age Appropriate   Airway: Facies: Feasible  Mallampati: I  Mouth/Opening: Full  TM distance: > 6 cm  Neck ROM: Full   Respiratory: Auscultation: CTAB     Resp. Rate: Normal     Resp. Effort: Normal      CV: Rhythm: Regular  Rate: Age appropriate  Heart: Normal Sounds  Edema: None   Comments:      Dental: Normal Dentition                Temp: 98.8  F (37.1  C) Temp src: Oral BP: 118/84 Pulse: 85   Resp: 14 SpO2: 96 %         242 lbs 0 oz  5' 6\"   Body mass index is 39.06 kg/m .       Physical Exam  Constitutional: Awake, alert, cooperative, no apparent distress, and appears stated age. obese  Eyes: Pupils equal, round and reactive to light, extra ocular muscles intact, sclera clear, conjunctiva normal.  HENT: Normocephalic, oral pharynx with moist mucus membranes, good dentition. No goiter appreciated.   Respiratory: Clear to auscultation bilaterally, " no crackles or wheezing.  Cardiovascular: Regular rate and rhythm, normal S1 and S2, and no murmur noted.  Carotids +2, no bruits. No edema. Palpable pulses to radial  DP and PT arteries.   GI: Normal bowel sounds, soft, non-distended, non-tender, no masses palpated, no hepatosplenomegaly.    Lymph/Hematologic: No cervical lymphadenopathy and no supraclavicular lymphadenopathy.  Genitourinary:  deferred  Skin: Warm and dry.    Musculoskeletal: Full ROM of neck. There is no redness, warmth, or swelling of the exposed joints. Gross motor strength is normal.    Neurologic: Awake, alert, oriented to name, place and time. Cranial nerves II-XII are grossly intact. Gait is normal.   Neuropsychiatric: Calm, cooperative. Normal affect.     Labs: (personally reviewed)   Component      Latest Ref Rng & Units 2020   Sodium      133 - 144 mmol/L 131 (L)   Potassium      3.4 - 5.3 mmol/L 3.9   Chloride      94 - 109 mmol/L 99   Carbon Dioxide      20 - 32 mmol/L 26   Anion Gap      3 - 14 mmol/L 6   Glucose      70 - 99 mg/dL 86   Urea Nitrogen      7 - 30 mg/dL 16   Creatinine      0.66 - 1.25 mg/dL 1.12   GFR Estimate      >60 mL/min/1.73:m2 >90   GFR Estimate If Black      >60 mL/min/1.73:m2 >90   Calcium      8.5 - 10.1 mg/dL 9.7   WBC      4.0 - 11.0 10e9/L 13.6 (H)   RBC Count      4.4 - 5.9 10e12/L 6.45 (H)   Hemoglobin      13.3 - 17.7 g/dL 19.5 (H)   Hematocrit      40.0 - 53.0 % 57.9 (H)   MCV      78 - 100 fl 90   MCH      26.5 - 33.0 pg 30.2   MCHC      31.5 - 36.5 g/dL 33.7   RDW      10.0 - 15.0 % 12.9   Platelet Count      150 - 450 10e9/L 287           [id number]  NSR    Outside records reviewed from: Care Everywhere    ASSESSMENT and PLAN  Amilcar Cope is a 24 year old person scheduled for a bilateral simple mastectomy,  nipple grafts, OnQ on 8/3/2020 by Dr. Pratt in management of gender dysphoria.  PAC referral for risk assessment and optimization for anesthesia with comorbid conditions of: allergic  "rhinitis, hypogammaglobulinemia, obesity, depression and anxiety.     Pre-operative considerations:  1.  Cardiac:  Functional status- METS >4.  He has been jogging 15 minutes at a time and doing body weight exercises.  He has no known cardiac history.  Intermediate risk surgery with 0.4% risk of major adverse cardiac event.   2.  Pulm:  Airway feasible.  NINOSKA risk: low.  Has history of childhood asthma, but no symptoms or asthma medications in adulthood.  3.  GI:  Risk of PONV score = 4.  If > 2, anti-emetic intervention recommended.  Scopolamine has been helpful in the past so will order that for DOS.  Any further PONV prevention measures as per anesthesia DOS.  4. Endo:  He is obese with a BMI >30.  5. ID:  Has a documented history of hypogammaglobulinemia and was on amoxicillin long term until 8th grade, but reports he was told he was \"cured\" after graduating high school.  He denies any recurrent infections and is no longer following with any specialist.    6. Neuro:  Hold imitrex 24 hours prior to surgery.    7. FEN: Na+ slightly low at 131.  Will order repeat for DOS.  8. Heme:  Hgb and hematocrit are chronically elevated - likely side effect of testosterone injections.      VTE risk: 0.5%    Patient is optimized and is acceptable candidate for the proposed procedure.  No further diagnostic evaluation is needed.         TIN Garces CNP  Preoperative Assessment Center  [facility]  [facility]  Phone: [phone]  Fax: [phone]  "

## 2020-07-28 NOTE — PROGRESS NOTES
"PLASTICS TOP PRE-OP  This is a 24 year old year old biological female transitioning to male who presents for a \"repeat pre-op visit\" prior to bilateral simple mastectomy with nipple graft reconstruction scheduled for 8/3/2020. Previously had surgery scheduled for 4/29 but was cancelled and rescheduled due to COVID-19 (had pre-op visit on 3/10). He is here today on his own. The patient had a negative mammogram in November 2019 and his letter of support has been received from Alesha Gardner at [facility] in [place]. PAC visit scheduled for later this afternoon. Pre-op COVID-19 test scheduled for 7/31. His mother will be able to help with post op cares after surgery, along with boyfriend and dad.     NOTE: The following information was discussed at previous pre-op visit on 3/10 in anticipation for surgery on 4/29.     My LPN, Kendra, discussed periop instructions with the patient including: not eating anything 8 hours prior to surgery, drinking clear liquids up to 2 hours before surgery except for morning medications with a sip of water, the preop shower with surgical soap which was given, and wearing a button- or zip-up shirt on the day of surgery. She also instructed the patient to not take any NSAIDs the week prior to surgery, but he may take Tylenol post-op for pain as needed.     I discussed the following with the patient; preop, intraop and postop phases of care on the day of surgery, the placement of a bladder catheter during surgery that will likely be removed in recovery, postop cares and limitations with relation to home and work settings, 5-lb weight restriction for the first 3 weeks postop, and how long to maintain limited activities. We also discussed Zofran, oxycodone, Z-shady, and antipruritics which will be prescribed. We discussed that preventing constipation will be their responsibility, and we discussed methods such as aloe, prune juice or Miralax.     In addition, we went over the possible risks and " complications involved with this elective procedure. These include but are not limited to: infection, bleeding, hematoma/seroma formation, and poor healing (including dehiscence, nipple graft loss, or hypertrophic scarring). We also discussed the possibility of altered chest sensation (either hypo or hypersensitive), residual deformities and asymmetries, possible further surgical revisions, and possible injury to surrounding neurovascular and musculoskeletal structures, including intra-axillary or intra-thoracic. We lastly discussed anesthetic risks including DVT/PE or cardiopulmonary events.      I recommend Wiley replace his metal piercings with plastic spacers on the day of surgery. All other questions were answered. Wiley will bring any other questions that arise to the day of surgery. I will see Wiley on 8/3.    Total time = 10 minutes, all spent educating about upcoming procedure.    This note was prepared on behalf of Jenn Pratt MD by Flaquita Clarke, a trained medical scribe, based on my observations and the provider's statements to me.

## 2020-07-28 NOTE — LETTER
"7/28/2020       RE: Amilcar Cope  [address] [place] [id number]     Dear Colleague,    Thank you for referring your patient, Amilcar Cope, to the [facility] PLASTIC AND RECONSTRUCTIVE SURGERY at [facility]. Please see a copy of my visit note below.    PLASTICS TOP PRE-OP  This is a 24 year old year old biological female transitioning to male who presents for a \"repeat pre-op visit\" prior to bilateral simple mastectomy with nipple graft reconstruction scheduled for 8/3/2020. Previously had surgery scheduled for 4/29 but was cancelled and rescheduled due to COVID-19 (had pre-op visit on 3/10). He is here today on his own. The patient had a negative mammogram in November 2019 and his letter of support has been received from Alesha Gardner at [facility] in [place]. PAC visit scheduled for later this afternoon. Pre-op COVID-19 test scheduled for 7/31. His mother will be able to help with post op cares after surgery, along with boyfriend and dad.     NOTE: The following information was discussed at previous pre-op visit on 3/10 in anticipation for surgery on 4/29.     My LPN, Kendra, discussed periop instructions with the patient including: not eating anything 8 hours prior to surgery, drinking clear liquids up to 2 hours before surgery except for morning medications with a sip of water, the preop shower with surgical soap which was given, and wearing a button- or zip-up shirt on the day of surgery. She also instructed the patient to not take any NSAIDs the week prior to surgery, but he may take Tylenol post-op for pain as needed.     I discussed the following with the patient; preop, intraop and postop phases of care on the day of surgery, the placement of a bladder catheter during surgery that will likely be removed in recovery, postop cares and limitations with relation to home and work settings, 5-lb weight restriction for the first 3 weeks postop, and how long to " maintain limited activities. We also discussed Zofran, oxycodone, Z-shady, and antipruritics which will be prescribed. We discussed that preventing constipation will be their responsibility, and we discussed methods such as aloe, prune juice or Miralax.     In addition, we went over the possible risks and complications involved with this elective procedure. These include but are not limited to: infection, bleeding, hematoma/seroma formation, and poor healing (including dehiscence, nipple graft loss, or hypertrophic scarring). We also discussed the possibility of altered chest sensation (either hypo or hypersensitive), residual deformities and asymmetries, possible further surgical revisions, and possible injury to surrounding neurovascular and musculoskeletal structures, including intra-axillary or intra-thoracic. We lastly discussed anesthetic risks including DVT/PE or cardiopulmonary events.      I recommend Wiley replace his metal piercings with plastic spacers on the day of surgery. All other questions were answered. Wiley will bring any other questions that arise to the day of surgery. I will see Wiley on 8/3.    Total time = 10 minutes, all spent educating about upcoming procedure.    This note was prepared on behalf of Jenn Pratt MD by Flaquita Clarke, a trained medical scribe, based on my observations and the provider's statements to me.

## 2020-07-28 NOTE — ANESTHESIA PREPROCEDURE EVALUATION
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 OnQ     LABS:  CBC:   Lab Results   Component Value Date    WBC 12.1 (H) 2019    HGB 18.3 (H) 2019    HCT 53.2 (H) 2019     2019     BMP:   Lab Results   Component Value Date     2019    POTASSIUM 3.6 2019    CHLORIDE 103 2019    CO2 25 2019    BUN 18 2019    CR 1.11 2019    GLC 82 2019     COAGS: No results found for: PTT, INR, FIBR  POC: No results found for: BGM, HCG, HCGS  OTHER:   Lab Results   Component Value Date    TOSHIA 9.0 2019    ALBUMIN 4.1 2019    PROTTOTAL 7.4 2019    ALT 23 2019    AST 21 2019    ALKPHOS 71 2019    BILITOTAL 0.8 2019        Preop Vitals    BP Readings from Last 3 Encounters:   20 118/84   20 (!) 141/86   20 137/85    Pulse Readings from Last 3 Encounters:   20 85   20 89   20 77      Resp Readings from Last 3 Encounters:   20 14   20 14   19 15    SpO2 Readings from Last 3 Encounters:   20 96%   20 96%   20 98%      Temp Readings from Last 1 Encounters:   20 98.8  F (37.1  C) (Oral)    Ht Readings from Last 1 Encounters:   20 1.676 m (5' 6\")      Wt Readings from Last 1 Encounters:   20 109.8 kg (242 lb)    Estimated body mass index is 39.06 kg/m  as calculated from the following:    Height as of this encounter: 1.676 m (5' 6\").    Weight as of this encounter: 109.8 kg (242 lb).     LDA:        Past Medical History:   Diagnosis Date     Allergic rhinitis      Anxiety      Asthma     child     Depression      Hypogammaglobulinemia (H)     sinus infections     Obesity      PONV (postoperative nausea " "and vomiting)      [place] teeth extracted       Past Surgical History:   Procedure Laterality Date     DAVINCI HYSTERECTOMY TOTAL, OOPHORECTOMY BILATERAL Bilateral 9/27/2019    Procedure: Davinci Assisted Total Laparoscopic Hysterectomy, Bilateral Salpingo Oophoretomy,;  Surgeon: Emiliana Pillai MD;  Location: [facility] OR      Allergies   Allergen Reactions     Sulfa Drugs Anaphylaxis     tachycardia        Anesthesia Evaluation     . Pt has had prior anesthetic. Type: General    History of anesthetic complications   - PONV  slow to wake      ROS/MED HX    ENT/Pulmonary:     (+)NINOSKA risk factors obese, allergic rhinitis, , . .   (-) tobacco use, asthma and recent URI   Neurologic:     (+)migraines,     Cardiovascular:  - neg cardiovascular ROS   (+) ----. : . . . :. . Previous cardiac testing date:results:date: results:ECG reviewed date:9/2019 results:NSR date: results:          METS/Exercise Tolerance:  >4 METS   Hematologic:  - neg hematologic  ROS      (-) history of blood clots and History of Transfusion   Musculoskeletal:  - neg musculoskeletal ROS       GI/Hepatic:  - neg GI/hepatic ROS       Renal/Genitourinary:  - ROS Renal section negative       Endo:     (+) Chronic steroid usage (weekly testosterone injections) for Obesity, .      Psychiatric:     (+) psychiatric history anxiety and depression      Infectious Disease:   (+) Other Infectious Disease hypogammaglobulinemia- reports he was \"deemed cured\" after graduating high school     (-) Recent Fever   Malignancy:      - no malignancy   Other:    (+) No chance of pregnancy no H/O Chronic Pain,                       PHYSICAL EXAM:   Mental Status/Neuro: A/A/O; Age Appropriate   Airway: Facies: Feasible  Mallampati: I  Mouth/Opening: Full  TM distance: > 6 cm  Neck ROM: Full   Respiratory: Auscultation: CTAB     Resp. Rate: Normal     Resp. Effort: Normal      CV: Rhythm: Regular  Rate: Age appropriate  Heart: Normal Sounds  Edema: None   Comments:      Dental: " Normal Dentition                Assessment:   ASA SCORE: 2    H&P: History and physical reviewed and following examination; no interval change.    NPO Status: NPO Appropriate     Plan:   Anes. Type:  General   Pre-Medication: None   Induction:  IV (Standard)   Airway: ETT; Oral   Access/Monitoring: PIV   Maintenance: Balanced     Postop Plan:   Postop Pain: Opioids  Postop Sedation/Airway: Not planned  Disposition: Outpatient     PONV Management:   Adult Risk Factors:, H/o PONV or Motion Sickness, Postop Opioids   Prevention: Ondansetron, Dexamethasone     CONSENT: Direct conversation   Plan and risks discussed with: Patient                   PAC Discussion and Assessment    ASA Classification: 2  Case is suitable for: [place]  Anesthetic techniques and relevant risks discussed: GA  Invasive monitoring and risk discussed:   Types:   Possibility and Risk of blood transfusion discussed:   NPO instructions given:   Additional anesthetic preparation and risks discussed:   Needs early admission to pre-op area:   Other:     PAC Resident/NP Anesthesia Assessment:  Amilcar Cope is a 24 year old person scheduled for a bilateral simple mastectomy,  nipple grafts, OnQ on 8/3/2020 by Dr. Pratt in management of gender dysphoria.  PAC referral for risk assessment and optimization for anesthesia with comorbid conditions of: allergic rhinitis, hypogammaglobulinemia, obesity, depression and anxiety.     Pre-operative considerations:  1.  Cardiac:  Functional status- METS >4.  He has been jogging 15 minutes at a time and doing body weight exercises.  He has no known cardiac history.  Intermediate risk surgery with 0.4% risk of major adverse cardiac event.   2.  Pulm:  Airway feasible.  NINOSKA risk: low.  Has history of childhood asthma, but no symptoms or asthma medications in adulthood.  3.  GI:  Risk of PONV score = 4.  If > 2, anti-emetic intervention recommended.  Scopolamine has been helpful in the past so will order that for  "DOS.  Any further PONV prevention measures as per anesthesia DOS.  4. Endo:  He is obese with a BMI >30.  5. ID:  Has a documented history of hypogammaglobulinemia and was on amoxicillin long term until 8th grade, but reports he was told he was \"cured\" after graduating high school.  He denies any recurrent infections and is no longer following with any specialist.    6. Neuro:  Hold imitrex 24 hours prior to surgery.    7. FEN: Na+ slightly low at 131.  Will order repeat for DOS.  8. Heme:  Hgb and hematocrit are chronically elevated - likely side effect of testosterone injections.        VTE risk: 0.5%    Patient is optimized and is acceptable candidate for the proposed procedure.  No further diagnostic evaluation is needed.     **For further details of assessment, testing, and physical exam please see H and P completed on same date.          Rosenda Almendarez DNP, RN, APRN      Reviewed and Signed by PAC Mid-Level Provider/Resident  Mid-Level Provider/Resident: Rosenda Almendarez DNP, RN, APRN  Date: 7/28/20  Time: 1400    Attending Anesthesiologist Anesthesia Assessment:        Anesthesiologist:   Date:   Time:   Pass/Fail:   Disposition:     PAC Pharmacist Assessment:        Pharmacist:   Date:   Time:    TIN Garces CNP     ANESTHESIA PREOP EVALUATION    PROCEDURE: Procedure(s):  bilateral simple mastectomy,  nipple grafts. OnQ    HPI: Amilcar Cope is a 24 year old adult who presents for above procedure 2/2 gender dysphoria.    PAST MEDICAL HISTORY:    Past Medical History:   Diagnosis Date     Allergic rhinitis      Anxiety      Asthma     child     Depression      Hypogammaglobulinemia (H)     sinus infections     Obesity      PONV (postoperative nausea and vomiting)      [place] teeth extracted        PAST SURGICAL HISTORY:    Past Surgical History:   Procedure Laterality Date     DAVINCI HYSTERECTOMY TOTAL, OOPHORECTOMY BILATERAL Bilateral 9/27/2019    Procedure: Davinci Assisted Total " Laparoscopic Hysterectomy, Bilateral Salpingo Oophoretomy,;  Surgeon: Emiliana Pillai MD;  Location: [facility] OR     TONSILLECTOMY  01/2020       SOCIAL HISTORY:       Social History     Tobacco Use     Smoking status: Never Smoker     Smokeless tobacco: Never Used   Substance Use Topics     Alcohol use: Not Currently       ALLERGIES:     Allergies   Allergen Reactions     Sulfa Drugs Anaphylaxis     tachycardia       MEDICATIONS:     No medications prior to admission.       Current Outpatient Medications   Medication Sig Dispense Refill     cetirizine (ZYRTEC) 10 MG tablet Take 10 mg by mouth At Bedtime        Cholecalciferol (VITAMIN D) 2000 units CAPS Take by mouth At Bedtime        diphenhydrAMINE (BENADRYL) 50 MG capsule Take 50 mg by mouth At Bedtime       escitalopram (LEXAPRO) 10 MG tablet Take 10 mg by mouth At Bedtime        estradiol (ESTRACE) 0.1 MG/GM vaginal cream Place 1 Application vaginally       melatonin 5 MG tablet Take 10 mg by mouth At Bedtime        SUMAtriptan Succinate (IMITREX PO) Take 25 mg by mouth as needed        testosterone cypionate (DEPOTESTOSTERONE) 200 MG/ML injection Inject into the muscle once a week 0.45 mL once weekly.       Testosterone Cypionate 50 MG/ML SOLN Inject as directed once a week          No current Epic-ordered facility-administered medications on file.      Current Outpatient Medications Ordered in Epic   Medication Sig Dispense Refill     cetirizine (ZYRTEC) 10 MG tablet Take 10 mg by mouth At Bedtime        Cholecalciferol (VITAMIN D) 2000 units CAPS Take by mouth At Bedtime        diphenhydrAMINE (BENADRYL) 50 MG capsule Take 50 mg by mouth At Bedtime       escitalopram (LEXAPRO) 10 MG tablet Take 10 mg by mouth At Bedtime        estradiol (ESTRACE) 0.1 MG/GM vaginal cream Place 1 Application vaginally       melatonin 5 MG tablet Take 10 mg by mouth At Bedtime        SUMAtriptan Succinate (IMITREX PO) Take 25 mg by mouth as needed        testosterone cypionate  (DEPOTESTOSTERONE) 200 MG/ML injection Inject into the muscle once a week 0.45 mL once weekly.       Testosterone Cypionate 50 MG/ML SOLN Inject as directed once a week          PHYSICAL EXAM:    Vitals: T Data Unavailable, P Data Unavailable, BP Data Unavailable, R Data Unavailable, SpO2  , Weight   Wt Readings from Last 2 Encounters:   07/28/20 109.8 kg (242 lb)   07/28/20 108.8 kg (239 lb 14.4 oz)       See doc flowsheet    NPO STATUS: see doc flowsheet    LABS:    BMP:  Recent Labs   Lab Test 07/28/20  1414   *   POTASSIUM 3.9   CHLORIDE 99   CO2 26   BUN 16   CR 1.12   GLC 86   TOSHIA 9.7       LFTs:   Recent Labs   Lab Test 09/27/19  0601   PROTTOTAL 7.4   ALBUMIN 4.1   BILITOTAL 0.8   ALKPHOS 71   AST 21   ALT 23       CBC:   Recent Labs   Lab Test 07/28/20  1414   WBC 13.6*   RBC 6.45*   HGB 19.5*   HCT 57.9*   MCV 90   MCH 30.2   MCHC 33.7   RDW 12.9          Coags:  No results for input(s): INR, PTT, FIBR in the last 23949 hours.    Imaging:  No orders to display       Presley Armstrong MD  Anesthesiology Staff  Pager [phone]    8/2/2020  4:03 PM

## 2020-07-28 NOTE — PATIENT INSTRUCTIONS
Preparing for Your Surgery      Name:  Amilcar Cope   MRN:  [id number]   :  1995   Today's Date:  2020       Arriving for surgery:  Surgery date:  8/3/20  Arrival time:  5:30 am    Restrictions due to COVID 19:  Patients are allowed one visitor in the pre-op period  All visitors must wear a mask  No visitors under 18  No ill visitors   parking is not available     Please come to:     [facility] [address]  [address] [place] [address]. [place]  [address]    - If you prefer, park your car in the Green Lot.    - Proceed to the 3rd floor, check in at the Adult Surgery Waiting Lounge. [phone]    If an escort is needed stop at the Information Desk in the lobby. Inform the information person that you are here for surgery. An escort to the Adult Surgery Waiting Lounge will be provided.    What can I eat or drink?  -  You may eat and drink normally for up to 8 hours before your surgery. (Until 11:30 pm)  -  You may have clear liquids until 2 hours before surgery. (Until 5:30 am)      Examples of clear liquids:  Water  Clear broth  Juices (apple, white grape, white cranberry  and cider) without pulp  Noncarbonated, powder based beverages  (lemonade and Nj-Aid)  Sodas (Sprite, 7-Up, ginger ale and seltzer)  Coffee or tea (without milk or cream)  Gatorade    -  No Alcohol for at least 24 hours before surgery     Which medicines can I take?    Hold Aspirin for 7 days before surgery.   Hold Multivitamins for 7 days before surgery.  Hold Supplements for 7 days before surgery.  Hold Ibuprofen (Advil, Motrin) for 1 day before surgery--unless otherwise directed by surgeon.  Hold Naproxen (Aleve) for 4 days before surgery.  **Hold Sumatriptan (Imitrex) 24 hours prior to surgery.**    -  PLEASE TAKE these medications the day of surgery:  Cetirizine (Zyrtec)  Escitalopram (Lexapro)    How do I prepare myself?  - Please take 2 showers before surgery using Scrubcare or Hibiclens soap.     Use this soap only from the neck to your toes.     Leave the soap on your skin for one minute--then rinse thoroughly.      You may use your own shampoo and conditioner; no other hair products.   - Please remove all jewelry and body piercings.  - No lotions, deodorants or fragrance.  - Bring your ID and insurance card.    - All patients are required to have a Covid-19 test within 4 days of surgery/procedure.      -Patients will be contacted by the [facility] scheduling team within 1 week of surgery to make an appointment.      - Patients may call the Scheduling team at [phone] if they have not been scheduled within 4 days of  surgery.      ALL PATIENTS GOING HOME THE SAME DAY OF SURGERY ARE REQUIRED TO HAVE A RESPONSIBLE ADULT TO DRIVE AND BE IN ATTENDANCE WITH THEM FOR 24 HOURS FOLLOWING SURGERY     Questions or Concerns:    - For any questions regarding the day of surgery or your hospital stay, please contact the Pre Admission Nursing Office at [phone].       - If you have health changes between today and your surgery please call your surgeon.       For questions after surgery please call your surgeons office.

## 2020-07-29 NOTE — RESULT ENCOUNTER NOTE
Devi Fitzgerald,    Your test results are attached.  All of your labs are good for your planned surgery.  Your sodium level is a touch low, so you may want to increase the salt in your diet a little prior to surgery.  I will have this rechecked the day of surgery.    Your hemoglobin and your hematocrit (blood counts) appear to be chronically elevated.  This may likely be related to your testosterone, but if you haven't at least discussed it with your primary care provider I would recommend you do so at some point so it can be monitored.  It is fine for surgery.          Rosenda Almendarez DNP, RN, ANP-C

## 2020-07-31 DIAGNOSIS — Z11.59 ENCOUNTER FOR SCREENING FOR OTHER VIRAL DISEASES: ICD-10-CM

## 2020-07-31 PROCEDURE — U0003 INFECTIOUS AGENT DETECTION BY NUCLEIC ACID (DNA OR RNA); SEVERE ACUTE RESPIRATORY SYNDROME CORONAVIRUS 2 (SARS-COV-2) (CORONAVIRUS DISEASE [COVID-19]), AMPLIFIED PROBE TECHNIQUE, MAKING USE OF HIGH THROUGHPUT TECHNOLOGIES AS DESCRIBED BY CMS-2020-01-R: HCPCS | Performed by: SURGERY

## 2020-08-01 LAB
SARS-COV-2 RNA SPEC QL NAA+PROBE: NOT DETECTED
SPECIMEN SOURCE: NORMAL

## 2020-08-03 ENCOUNTER — ANESTHESIA (OUTPATIENT)
Dept: SURGERY | Facility: CLINIC | Age: 25
End: 2020-08-03
Payer: COMMERCIAL

## 2020-08-03 ENCOUNTER — HOSPITAL ENCOUNTER (OUTPATIENT)
Facility: CLINIC | Age: 25
Discharge: HOME | End: 2020-08-03
Attending: SURGERY | Admitting: SURGERY
Payer: COMMERCIAL

## 2020-08-03 VITALS
BODY MASS INDEX: 38.58 KG/M2 | TEMPERATURE: 98.7 F | HEIGHT: 66 IN | HEART RATE: 99 BPM | DIASTOLIC BLOOD PRESSURE: 83 MMHG | SYSTOLIC BLOOD PRESSURE: 126 MMHG | OXYGEN SATURATION: 96 % | WEIGHT: 240.08 LBS | RESPIRATION RATE: 16 BRPM

## 2020-08-03 DIAGNOSIS — F64.0 GENDER DYSPHORIA IN ADOLESCENT AND ADULT: ICD-10-CM

## 2020-08-03 LAB
GLUCOSE SERPL-MCNC: 87 MG/DL (ref 70–99)
HGB BLD-MCNC: 18.5 G/DL (ref 13.3–17.7)
SODIUM SERPL-SCNC: 139 MMOL/L (ref 133–144)

## 2020-08-03 PROCEDURE — 40000170 ZZH STATISTIC PRE-PROCEDURE ASSESSMENT II: Performed by: SURGERY

## 2020-08-03 PROCEDURE — 25000128 H RX IP 250 OP 636: Performed by: SURGERY

## 2020-08-03 PROCEDURE — 27110038 ZZH RX 271: Performed by: SURGERY

## 2020-08-03 PROCEDURE — 71000014 ZZH RECOVERY PHASE 1 LEVEL 2 FIRST HR: Performed by: SURGERY

## 2020-08-03 PROCEDURE — 36000059 ZZH SURGERY LEVEL 3 EA 15 ADDTL MIN UMMC: Performed by: SURGERY

## 2020-08-03 PROCEDURE — 36415 COLL VENOUS BLD VENIPUNCTURE: CPT | Performed by: STUDENT IN AN ORGANIZED HEALTH CARE EDUCATION/TRAINING PROGRAM

## 2020-08-03 PROCEDURE — 25000132 ZZH RX MED GY IP 250 OP 250 PS 637: Performed by: STUDENT IN AN ORGANIZED HEALTH CARE EDUCATION/TRAINING PROGRAM

## 2020-08-03 PROCEDURE — 88305 TISSUE EXAM BY PATHOLOGIST: CPT | Performed by: SURGERY

## 2020-08-03 PROCEDURE — 88305 TISSUE EXAM BY PATHOLOGIST: CPT | Mod: 26,59 | Performed by: SURGERY

## 2020-08-03 PROCEDURE — 84295 ASSAY OF SERUM SODIUM: CPT | Performed by: STUDENT IN AN ORGANIZED HEALTH CARE EDUCATION/TRAINING PROGRAM

## 2020-08-03 PROCEDURE — 25000566 ZZH SEVOFLURANE, EA 15 MIN: Performed by: SURGERY

## 2020-08-03 PROCEDURE — 85018 HEMOGLOBIN: CPT | Performed by: STUDENT IN AN ORGANIZED HEALTH CARE EDUCATION/TRAINING PROGRAM

## 2020-08-03 PROCEDURE — 25000125 ZZHC RX 250: Performed by: NURSE PRACTITIONER

## 2020-08-03 PROCEDURE — 27210794 ZZH OR GENERAL SUPPLY STERILE: Performed by: SURGERY

## 2020-08-03 PROCEDURE — 25000125 ZZHC RX 250: Performed by: SURGERY

## 2020-08-03 PROCEDURE — 25000125 ZZHC RX 250: Performed by: NURSE ANESTHETIST, CERTIFIED REGISTERED

## 2020-08-03 PROCEDURE — 25800030 ZZH RX IP 258 OP 636: Performed by: NURSE ANESTHETIST, CERTIFIED REGISTERED

## 2020-08-03 PROCEDURE — 37000008 ZZH ANESTHESIA TECHNICAL FEE, 1ST 30 MIN: Performed by: SURGERY

## 2020-08-03 PROCEDURE — 71000027 ZZH RECOVERY PHASE 2 EACH 15 MINS: Performed by: SURGERY

## 2020-08-03 PROCEDURE — 37000009 ZZH ANESTHESIA TECHNICAL FEE, EACH ADDTL 15 MIN: Performed by: SURGERY

## 2020-08-03 PROCEDURE — 36000057 ZZH SURGERY LEVEL 3 1ST 30 MIN - UMMC: Performed by: SURGERY

## 2020-08-03 PROCEDURE — 25000128 H RX IP 250 OP 636: Performed by: NURSE ANESTHETIST, CERTIFIED REGISTERED

## 2020-08-03 PROCEDURE — 25000128 H RX IP 250 OP 636: Performed by: STUDENT IN AN ORGANIZED HEALTH CARE EDUCATION/TRAINING PROGRAM

## 2020-08-03 PROCEDURE — 82947 ASSAY GLUCOSE BLOOD QUANT: CPT | Performed by: STUDENT IN AN ORGANIZED HEALTH CARE EDUCATION/TRAINING PROGRAM

## 2020-08-03 RX ORDER — SCOLOPAMINE TRANSDERMAL SYSTEM 1 MG/1
1 PATCH, EXTENDED RELEASE TRANSDERMAL ONCE
Status: DISCONTINUED | OUTPATIENT
Start: 2020-08-03 | End: 2020-08-03 | Stop reason: HOSPADM

## 2020-08-03 RX ORDER — LIDOCAINE 40 MG/G
CREAM TOPICAL
Status: DISCONTINUED | OUTPATIENT
Start: 2020-08-03 | End: 2020-08-03 | Stop reason: HOSPADM

## 2020-08-03 RX ORDER — HYDROMORPHONE HYDROCHLORIDE 1 MG/ML
.3-.5 INJECTION, SOLUTION INTRAMUSCULAR; INTRAVENOUS; SUBCUTANEOUS EVERY 10 MIN PRN
Status: DISCONTINUED | OUTPATIENT
Start: 2020-08-03 | End: 2020-08-03 | Stop reason: HOSPADM

## 2020-08-03 RX ORDER — ONDANSETRON 2 MG/ML
4 INJECTION INTRAMUSCULAR; INTRAVENOUS EVERY 30 MIN PRN
Status: DISCONTINUED | OUTPATIENT
Start: 2020-08-03 | End: 2020-08-03 | Stop reason: HOSPADM

## 2020-08-03 RX ORDER — ACETAMINOPHEN 325 MG/1
975 TABLET ORAL ONCE
Status: COMPLETED | OUTPATIENT
Start: 2020-08-03 | End: 2020-08-03

## 2020-08-03 RX ORDER — SODIUM CHLORIDE, SODIUM LACTATE, POTASSIUM CHLORIDE, CALCIUM CHLORIDE 600; 310; 30; 20 MG/100ML; MG/100ML; MG/100ML; MG/100ML
INJECTION, SOLUTION INTRAVENOUS CONTINUOUS
Status: DISCONTINUED | OUTPATIENT
Start: 2020-08-03 | End: 2020-08-03 | Stop reason: HOSPADM

## 2020-08-03 RX ORDER — GABAPENTIN 300 MG/1
300 CAPSULE ORAL ONCE
Status: COMPLETED | OUTPATIENT
Start: 2020-08-03 | End: 2020-08-03

## 2020-08-03 RX ORDER — BUPIVACAINE HYDROCHLORIDE 2.5 MG/ML
INJECTION, SOLUTION EPIDURAL; INFILTRATION; INTRACAUDAL PRN
Status: DISCONTINUED | OUTPATIENT
Start: 2020-08-03 | End: 2020-08-03 | Stop reason: HOSPADM

## 2020-08-03 RX ORDER — CEFAZOLIN SODIUM 1 G/3ML
INJECTION, POWDER, FOR SOLUTION INTRAMUSCULAR; INTRAVENOUS CONTINUOUS PRN
Status: DISCONTINUED | OUTPATIENT
Start: 2020-08-03 | End: 2020-08-03 | Stop reason: HOSPADM

## 2020-08-03 RX ORDER — OXYCODONE HYDROCHLORIDE 5 MG/1
5 TABLET ORAL EVERY 4 HOURS PRN
Status: DISCONTINUED | OUTPATIENT
Start: 2020-08-03 | End: 2020-08-03 | Stop reason: HOSPADM

## 2020-08-03 RX ORDER — PROPOFOL 10 MG/ML
INJECTION, EMULSION INTRAVENOUS CONTINUOUS PRN
Status: DISCONTINUED | OUTPATIENT
Start: 2020-08-03 | End: 2020-08-03

## 2020-08-03 RX ORDER — NALOXONE HYDROCHLORIDE 0.4 MG/ML
.1-.4 INJECTION, SOLUTION INTRAMUSCULAR; INTRAVENOUS; SUBCUTANEOUS
Status: DISCONTINUED | OUTPATIENT
Start: 2020-08-03 | End: 2020-08-03 | Stop reason: HOSPADM

## 2020-08-03 RX ORDER — FENTANYL CITRATE 50 UG/ML
25-50 INJECTION, SOLUTION INTRAMUSCULAR; INTRAVENOUS
Status: DISCONTINUED | OUTPATIENT
Start: 2020-08-03 | End: 2020-08-03 | Stop reason: HOSPADM

## 2020-08-03 RX ORDER — MEPERIDINE HYDROCHLORIDE 25 MG/ML
12.5 INJECTION INTRAMUSCULAR; INTRAVENOUS; SUBCUTANEOUS
Status: DISCONTINUED | OUTPATIENT
Start: 2020-08-03 | End: 2020-08-03 | Stop reason: HOSPADM

## 2020-08-03 RX ORDER — CEFAZOLIN SODIUM 1 G/3ML
1 INJECTION, POWDER, FOR SOLUTION INTRAMUSCULAR; INTRAVENOUS SEE ADMIN INSTRUCTIONS
Status: DISCONTINUED | OUTPATIENT
Start: 2020-08-03 | End: 2020-08-03 | Stop reason: HOSPADM

## 2020-08-03 RX ORDER — EPHEDRINE SULFATE 50 MG/ML
INJECTION, SOLUTION INTRAMUSCULAR; INTRAVENOUS; SUBCUTANEOUS PRN
Status: DISCONTINUED | OUTPATIENT
Start: 2020-08-03 | End: 2020-08-03

## 2020-08-03 RX ORDER — CEFAZOLIN SODIUM 2 G/100ML
2 INJECTION, SOLUTION INTRAVENOUS
Status: COMPLETED | OUTPATIENT
Start: 2020-08-03 | End: 2020-08-03

## 2020-08-03 RX ORDER — ONDANSETRON 4 MG/1
4 TABLET, ORALLY DISINTEGRATING ORAL EVERY 30 MIN PRN
Status: DISCONTINUED | OUTPATIENT
Start: 2020-08-03 | End: 2020-08-03 | Stop reason: HOSPADM

## 2020-08-03 RX ORDER — SODIUM CHLORIDE, SODIUM LACTATE, POTASSIUM CHLORIDE, CALCIUM CHLORIDE 600; 310; 30; 20 MG/100ML; MG/100ML; MG/100ML; MG/100ML
INJECTION, SOLUTION INTRAVENOUS CONTINUOUS PRN
Status: DISCONTINUED | OUTPATIENT
Start: 2020-08-03 | End: 2020-08-03

## 2020-08-03 RX ORDER — ONDANSETRON 4 MG/1
4 TABLET, ORALLY DISINTEGRATING ORAL EVERY 8 HOURS PRN
Qty: 12 TABLET | Refills: 0 | Status: SHIPPED | OUTPATIENT
Start: 2020-08-03

## 2020-08-03 RX ORDER — OXYCODONE HYDROCHLORIDE 10 MG/1
5-10 TABLET ORAL EVERY 6 HOURS PRN
Qty: 20 TABLET | Refills: 0 | Status: SHIPPED | OUTPATIENT
Start: 2020-08-03

## 2020-08-03 RX ORDER — LIDOCAINE HYDROCHLORIDE 20 MG/ML
INJECTION, SOLUTION INFILTRATION; PERINEURAL PRN
Status: DISCONTINUED | OUTPATIENT
Start: 2020-08-03 | End: 2020-08-03

## 2020-08-03 RX ORDER — DEXAMETHASONE SODIUM PHOSPHATE 4 MG/ML
INJECTION, SOLUTION INTRA-ARTICULAR; INTRALESIONAL; INTRAMUSCULAR; INTRAVENOUS; SOFT TISSUE PRN
Status: DISCONTINUED | OUTPATIENT
Start: 2020-08-03 | End: 2020-08-03

## 2020-08-03 RX ORDER — ONDANSETRON 2 MG/ML
INJECTION INTRAMUSCULAR; INTRAVENOUS PRN
Status: DISCONTINUED | OUTPATIENT
Start: 2020-08-03 | End: 2020-08-03

## 2020-08-03 RX ORDER — PROPOFOL 10 MG/ML
INJECTION, EMULSION INTRAVENOUS PRN
Status: DISCONTINUED | OUTPATIENT
Start: 2020-08-03 | End: 2020-08-03

## 2020-08-03 RX ORDER — FENTANYL CITRATE 50 UG/ML
INJECTION, SOLUTION INTRAMUSCULAR; INTRAVENOUS PRN
Status: DISCONTINUED | OUTPATIENT
Start: 2020-08-03 | End: 2020-08-03

## 2020-08-03 RX ORDER — AZITHROMYCIN 250 MG/1
TABLET, FILM COATED ORAL
Qty: 6 TABLET | Refills: 0 | Status: SHIPPED | OUTPATIENT
Start: 2020-08-03 | End: 2020-08-08

## 2020-08-03 RX ADMIN — PROPOFOL 200 MG: 10 INJECTION, EMULSION INTRAVENOUS at 07:35

## 2020-08-03 RX ADMIN — LIDOCAINE HYDROCHLORIDE 100 MG: 20 INJECTION, SOLUTION INFILTRATION; PERINEURAL at 07:35

## 2020-08-03 RX ADMIN — ROCURONIUM BROMIDE 50 MG: 10 INJECTION INTRAVENOUS at 07:35

## 2020-08-03 RX ADMIN — DEXAMETHASONE SODIUM PHOSPHATE 4 MG: 4 INJECTION, SOLUTION INTRAMUSCULAR; INTRAVENOUS at 07:35

## 2020-08-03 RX ADMIN — Medication: at 11:50

## 2020-08-03 RX ADMIN — HYDROMORPHONE HYDROCHLORIDE 0.5 MG: 1 INJECTION, SOLUTION INTRAMUSCULAR; INTRAVENOUS; SUBCUTANEOUS at 12:27

## 2020-08-03 RX ADMIN — ROCURONIUM BROMIDE 30 MG: 10 INJECTION INTRAVENOUS at 08:33

## 2020-08-03 RX ADMIN — HYDROMORPHONE HYDROCHLORIDE 0.2 MG: 1 INJECTION, SOLUTION INTRAMUSCULAR; INTRAVENOUS; SUBCUTANEOUS at 11:13

## 2020-08-03 RX ADMIN — SUGAMMADEX 200 MG: 100 INJECTION, SOLUTION INTRAVENOUS at 11:49

## 2020-08-03 RX ADMIN — HYDROMORPHONE HYDROCHLORIDE 0.2 MG: 1 INJECTION, SOLUTION INTRAMUSCULAR; INTRAVENOUS; SUBCUTANEOUS at 08:52

## 2020-08-03 RX ADMIN — PROCHLORPERAZINE EDISYLATE 5 MG: 5 INJECTION INTRAMUSCULAR; INTRAVENOUS at 13:25

## 2020-08-03 RX ADMIN — ROCURONIUM BROMIDE 20 MG: 10 INJECTION INTRAVENOUS at 09:33

## 2020-08-03 RX ADMIN — HYDROMORPHONE HYDROCHLORIDE 0.1 MG: 1 INJECTION, SOLUTION INTRAMUSCULAR; INTRAVENOUS; SUBCUTANEOUS at 10:14

## 2020-08-03 RX ADMIN — FENTANYL CITRATE 25 MCG: 50 INJECTION, SOLUTION INTRAMUSCULAR; INTRAVENOUS at 10:10

## 2020-08-03 RX ADMIN — CEFAZOLIN 2 G: 10 INJECTION, POWDER, FOR SOLUTION INTRAVENOUS at 07:58

## 2020-08-03 RX ADMIN — Medication 5 MG: at 09:18

## 2020-08-03 RX ADMIN — Medication 5 MG: at 09:14

## 2020-08-03 RX ADMIN — SCOPALAMINE 1 PATCH: 1 PATCH, EXTENDED RELEASE TRANSDERMAL at 06:30

## 2020-08-03 RX ADMIN — SODIUM CHLORIDE, POTASSIUM CHLORIDE, SODIUM LACTATE AND CALCIUM CHLORIDE: 600; 310; 30; 20 INJECTION, SOLUTION INTRAVENOUS at 09:20

## 2020-08-03 RX ADMIN — MIDAZOLAM 2 MG: 1 INJECTION INTRAMUSCULAR; INTRAVENOUS at 07:29

## 2020-08-03 RX ADMIN — PROPOFOL 20 MCG/KG/MIN: 10 INJECTION, EMULSION INTRAVENOUS at 08:05

## 2020-08-03 RX ADMIN — ONDANSETRON 4 MG: 2 INJECTION INTRAMUSCULAR; INTRAVENOUS at 11:47

## 2020-08-03 RX ADMIN — Medication 5 MG: at 08:36

## 2020-08-03 RX ADMIN — ONDANSETRON 4 MG: 2 INJECTION INTRAMUSCULAR; INTRAVENOUS at 12:36

## 2020-08-03 RX ADMIN — ACETAMINOPHEN 975 MG: 325 TABLET, FILM COATED ORAL at 06:31

## 2020-08-03 RX ADMIN — FENTANYL CITRATE 25 MCG: 50 INJECTION, SOLUTION INTRAMUSCULAR; INTRAVENOUS at 09:12

## 2020-08-03 RX ADMIN — SODIUM CHLORIDE, POTASSIUM CHLORIDE, SODIUM LACTATE AND CALCIUM CHLORIDE: 600; 310; 30; 20 INJECTION, SOLUTION INTRAVENOUS at 07:29

## 2020-08-03 RX ADMIN — Medication 5 MG: at 08:34

## 2020-08-03 RX ADMIN — FENTANYL CITRATE 25 MCG: 50 INJECTION, SOLUTION INTRAMUSCULAR; INTRAVENOUS at 10:45

## 2020-08-03 RX ADMIN — FENTANYL CITRATE 100 MCG: 50 INJECTION, SOLUTION INTRAMUSCULAR; INTRAVENOUS at 07:35

## 2020-08-03 RX ADMIN — GABAPENTIN 300 MG: 300 CAPSULE ORAL at 06:31

## 2020-08-03 ASSESSMENT — MIFFLIN-ST. JEOR: SCORE: 2026.75

## 2020-08-03 NOTE — BRIEF OP NOTE
[facility], [place]    Brief Operative Note    Pre-operative diagnosis: Gender dysphoria in adolescent and adult [F64.0]  Post-operative diagnosis Same as pre-operative diagnosis    Procedure: Procedure(s):  bilateral simple mastectomy,  nipple grafts. OnQ  Surgeon: Surgeon(s) and Role:     * Jenn Pratt MD - Primary     * Meena Choi PA-C  Anesthesia: General   Estimated blood loss: 200 ml  Drains: [place] and On-Q pump  Specimens:   ID Type Source Tests Collected by Time Destination   A : left breast tissue Tissue Breast, Left SURGICAL PATHOLOGY EXAM (Canceled) Jenn Pratt MD 8/3/2020  8:36 AM    B : Right breast tissue Tissue Breast, Right SURGICAL PATHOLOGY EXAM (Canceled) Jenn Pratt MD 8/3/2020  8:37 AM      Findings:   None.  Complications: None.  Implants: * No implants in log *    Bilateral simple mastectomies with free nipple grafts. Grafts secured with bolster dressings. Jpx2, on Q pump. Wrapped with kerlix and ace wrap.     LR: 1300ml  UO: 250ml

## 2020-08-03 NOTE — DISCHARGE INSTRUCTIONS
Supply list  Q-tips and or/ clean washcloth  Gauze or split gauze (2 x 2 )   Paper tape (1 inch wide)  Specimen cup                                                                       Chuckie Franklin Drain    Home Care Instructions   What is a Chuckie Franklin (ZULEIKA) drain?  This is a small tube that connects to a bulb. Its gentle suction removes extra fluid from a surgical wound. Your doctor will remove the tube when the amount of fluid decreases.  The color and amount of fluid varies. Right after surgery the fluid is bright red. Over time, it changes to light pink and may become clear or the color of straw.  How should I care for my tube site?    Keep the skin around the tube dry. Check with your doctor about how to shower. You may need to cover the site with plastic when you shower. Or, it may be okay to let the site get wet and put on a clean bandage after you shower.    Tape the tube to the skin below the bandage. Make sure to keep some slack in the tube. This helps prevent pulling on the stitches.    You will need to change your bandage at least once a day. If the bandage gets wet, you will need to change it again.  To change the bandage: Prepare    Clean your work area with alcohol or soap and water and a paper towel.    Wash your hands with soap and water.    Place on your clean work area:    Bag for old bandage    Gauze bandage and one-inch paper tape    Anti-bacterial wash (.9% normal saline) or soap and water    Cotton-tipped swab (like Q-Tips) or a clean wash cloth.  Remove the old bandage    Remove the old bandage and throw it out. Be carefulnot to pull on your stitches or tubing.     Do not use a scissors--you might cut the tube.    Check for any redness, swelling, drainage or broken stitches. If you have any of these, call your doctor  Clean the site    Wash your hands.    Clean the skin around the tube site. Use soap and   water or .9% saline with cotton swabs or a clean wash   cloth. Start at the tube  site and move outward in a circular   motion about 1 to 2 inches away from the site.  Rinse with water and pat dry.   Replace the bandage    We will give you a gauze bandage (two per package).    If you have bandages with slits, place one bandage around the tube. Place the other bandage under the tube with the slit facing the opposite way. Tape the bandages in place.    If you have bandages without slits, fold each one in half. Place one above the tube and one under the tube. Tape in place.  Tape the tube    Tape the tube to the skin. Leave some slack in the tubing.  Use paper tape or adhesive tape if paper tape will not hold. Your nurse may show you how to use a StayFix bandage (a tube stabilizer)      Clean up    Throw out all used materials.    Clean work area with alcohol or soap and water and a paper towel.    Wash your hands with soap and water.    Bandage, tube and tape                How should I care for the bulb?    Keep the bulb compressed at all times except while you empty it.    Attach the bulb to your clothing with tape and a safety pin.    Try to empty the bulb at the same time every day. Empty the bulb at least once a day, or when the bulb becomes half full. If it becomes too full, there will not be enough suction.  To empty the bulb:    Wash your hands.    0pen the bulb cap.    Drain the fluid from the bulb into the measuring cup. If you have two drains, use two cups.    Clean the mouth of the bulb with an alcohol wipe if your nurse told you to.    Squeeze the bulb (fold it in half before you close the bulb cap). If it does not stay compressed, call your nurse or clinic.    Write the amount of drainage on the drainage record (see back page). If you have two drains, write the amount for each bulb.    Flush the drainage down the toilet. Rinse the measuring cup.  Wash your hands.   When should I call my doctor?   Call your doctor if:    You have a fever over 101 F (38.3 C), taken under the  tongue.    The drainage increases or smells bad.    The skin around your tube has increased redness, swelling, warmth or pain.    You have pus or fluid leaking at the tube site.    Your stitches break.    You think the tube is not draining.    The tube falls out.    You have any problems or concerns.  If your doctor has instructed you to strip your tube, follow these steps:    Use lotion to make the thumb and index finger of one hand slippery.    With the other hand, pinch off the top of the tube close to the skin.    While pinching the tube, squeeze the tube with your slippery thumb and index finger. Keep squeezing the tube as you run your fingers down toward the bulb. This will move the fluid into the bulb.    Let go of the tubing with both hands. If the tube is still blocked, repeat these steps three or four times. Make sure that the bulb is compressed, so it creates suction.   Your drainage record    Empty your bulb at the same time each day. Write down the date, time and amount of drainage for each bulb. You may wish to make notes about the   color and smell as well.  Bring this record to each clinic visit.               Your doctor may ask you to call the office each day to report the amount of drainage. If so, please call:  Dr. calderon .  Scopolamine Patch  (Absorbed through the skin)    The Scopolamine Patch prevents nausea and vomiting caused by motion sickness or anesthesia and surgery in adults.    Brand Name(s): Transderm Scop, Transderm-Scope  There may be other brand names for this medicine.    When This Medicine Should Not Be Used:  You should not use this medicine if you have had an allergic reaction to scopolamine, or if you have narrow angle glaucoma.    How to Use This Medicine:    Your doctor will tell you how many patches to use, where to apply them, and how often to apply them.     Do not use more patches or apply them more often than your doctor tells you to.    This medicine comes with patient  instructions. Read and follow these instructions carefully. Ask your doctor or pharmacist if you have any questions.    To prevent motion sickness, apply the patch at least 4 hours before you need it.    Wash and dry your hands thoroughly before applying the patch.    Leave the patch in its sealed wrapper until you are ready to put it on. Tear the wrapper open carefully. NEVER CUT the wrapper or the patch with scissors. Do not use any patch that has been cut by accident.    Take the liner off the sticky side before applying.    Apply the patch to dry, hairless skin behind the ear.    If the patch is loose or falls off, apply a new patch at a different place behind the ear.    After you take off the patch, wash the place where the patch was and your hands thoroughly.    Only one patch should be used at any time.    If a dose is missed:  If you forget to wear or change a patch, put one on as soon as you can. If it is almost time to put on your next patch, wait until then to apply a new patch and skip the one you missed. Do not apply extra patches to make up for a missed dose.    How to Store and Dispose of This Medicine:    Store the patches at room temperature in a closed container, away from heat, moisture and direct light.    Fold the used patch in half with the sticky sides together. Throw any used patch away so that children or pets cannot get to it. You will also need to throw away old patches after the expiration date has passed.    Keep all medicine away from children and never share your medicine with anyone.    Ask your doctor or pharmacist before using any other medicine, including over-the-counter medicines, vitamins, and herbal products.  Tell your doctor if you are using any medicines that make you sleepy. These include sleeping pills, cold and allergy medicine, narcotic pain relievers and sedatives.     Tell your doctor if you are using any medicine that make you sleepy. These include sleeping pills,  osvaldo and allergy medicine, narcotic pain relievers and sedatives.    Do not drink alcohol while you are using this medicine.     Warnings While Using This Medicine:    Make sure your doctor knows if you are pregnant or breastfeeding or if you have glaucoma, prostate problems, trouble urinating, blocked bowels, liver disease, kidney disease or a history of seizures or mental illness.    This medicine can cause blurring of vision and other vision problems if it comes in contact with the eyes. This medicine may also cause problems with urination. If any of these reactions occur, remove the patch and call your doctor right away.    This medicine may make you dizzy or drowsy. Avoid driving, using machines, or doing anything else that could be dangerous if you are not alert. If you plan to participate in underwater sports, this medicine may cause disorienting effects. If this is a concern for you, talk with your doctor.    This medicine may make you sweat less and cause your body to get too hot. Be careful in hot weather, when you are exercising or if using sauna or whirlpool.    Make sure any doctor or dentist who treats you knows that you are using this medicine. This medicine may affect the results of certain medical tests.    Skin burns have been reported at the patch site in several patients wearing an aluminized transdermal system during a magnetic resonance imaging scan (MRI). Because Transderm Scop contains aluminum, it is recommended to remove the system before undergoing an MRI.    Call your doctor right away if you notice any of these side effects:    Allergic reaction: Itching or hives, swelling in your face or hands, swelling or tingling in your mouth or throat, chest tightness, trouble breathing    Blurred vision    Confusion or memory loss    Fast, slow or uneven heartbeat    Lightheadedness, dizziness, drowsiness or fainting    Seeing, hearing or feeling things that are not there    Severe eye  pain    Trouble urinating    If you notice these less serious side effects, talk with your doctor:    Dry mouth    Dry, itchy or red eyes    Restlessness    Skin rash or redness    If you notice other side effects that you think are caused by this medicine, tell your doctor immediately.    Rev. 4/2014    Same-Day Surgery   Adult Discharge Orders & Instructions     For 24 hours after surgery:  1. Get plenty of rest.  A responsible adult must stay with you for at least 24 hours after you leave the hospital.   2. Pain medication can slow your reflexes. Do not drive or use heavy equipment.  If you have weakness or tingling, don't drive or use heavy equipment until this feeling goes away.  3. Mixing alcohol and pain medication can cause dizziness and slow your breathing. It can even be fatal. Do not drink alcohol while taking pain medication.  4. Avoid strenuous or risky activities.  Ask for help when climbing stairs.   5. You may feel lightheaded.  If so, sit for a few minutes before standing.  Have someone help you get up.   6. If you have nausea (feel sick to your stomach), drink only clear liquids such as apple juice, ginger ale, broth or 7-Up.  Rest may also help.  Be sure to drink enough fluids.  Move to a regular diet as you feel able. Take pain medications with a small amount of solid food, such as toast or crackers, to avoid nausea.   7. A slight fever is normal. Call the doctor if your fever is over 100 F (37.7 C) (taken under the tongue) or lasts longer than 24 hours.  8. You may have a dry mouth, muscle aches, trouble sleeping or a sore throat.  These symptoms should go away after 24 hours.  9. Do not make important or legal decisions.   Pain Management:      1. Take pain medication (if prescribed) for pain as directed by your physician.        2. WARNING: If the pain medication you have been prescribed contains Tylenol  (acetaminophen), DO NOT take additional doses of Tylenol (acetaminophen).     Call your  doctor for any of the followin.  Signs of infection (fever, growing tenderness at the surgery site, severe pain, a large amount of drainage or bleeding, foul-smelling drainage, redness, swelling).    2.  It has been over 8 to 10 hours since surgery and you are still not able to urinate (pee).    3.  Headache for over 24 hours.    4.  Numbness, tingling or weakness the day after surgery (if you had spinal anesthesia).  To contact a doctor, call [phone] or:      [phone] and ask for the Resident On Call for:         Plastic and reconstructive surgery (answered 24 hours a day)      Emergency Department:  [facility]: [phone]  [facility]: [phone]               Rev. 10/2014   ON-Q  C-bloc Continuous Nerve Block Discharge Instructions        Your anesthesiologist performed a nerve block (a procedure that blocks pain to only a specific area) by inserting a small tube in your body.  This tube is connected to a pump that will help control your pain.    The pump is shaped like a balloon and is filled with medicine that causes numbness or loss of sensation to help control your pain around the incision or site of injury.  The pain pump DOES NOT contain controlled substances.      The medicine in the pump may alter your ability to feel changes in temperature or pressure. Your doctor will tell you if any special precautions apply to you.       The Pump      DO NOT SQUEEZE THE PUMP.     The pump delivers medicine at a very slow rate.    You will NOT see the medicine moving through the tubing.    As the medicine is delivered, the pump ball will slowly become smaller.    It may take a day or so before you notice a change in the size and look of the pump.    Depending on the size of your pump, it may take 2 - 5 days to give all the medicine.    The middle part of the pump may look like an apple core when empty.  Managing Your Pain      The continuous nerve block infusion  may not block all of the pain from your surgery so it is important that you take the pain medicines prescribed by your surgeon if you need them.      If you continue to have difficulty with your pain control, please page or call the anesthesiologist.  Caring for Your Pump at Home    Wear the pump on the outside of clothing - away from your skin and cold therapy (ice packs).  The delivery rate is accurate only at room temperature.    Make sure the white clamp on the tubing remains open (moves freely on the tubing).    Make sure there are no kinks in the tubing.    Do not tape or cover up the filter.    Protect the pump from sunlight and heat.    When sleeping:   o Do not place the pump underneath the bed covers where the pump may become too warm.  o Do not place the pump on the floor or hang the pump on a bed post as these situations may cause the tubing to get tangled and get pulled out.    Bathing/Showering:    o We recommend taking sponge baths until the pump is removed.  o It is important to not get the area where the tube enters your body wet.    It is normal for a small amount of fluid to leak from the hole in your body where the tube is inserted.  If this occurs, reinforce the bandage with another bandage.  The Infusion    Do not turn the infusion off unless your anesthesiologist has told you to do so.    Do not change the flow rate of the infusion unless your anesthesiologist told you to do so.  Changing the flow rate without your doctor s instruction may result in the wrong dose of medicine, which could cause serious injury.    If your anesthesiologist told you to change the rate of your infusion:  o Flip open the clear cover.  o Turn the white key on the select-a-flow dial to the instructed rate.  (The rate of the infusion should line up with the black arrow at the top of the controller.)    o Listen for the click when you move the dial.  Removing the Tubing    When the pump is empty or if you have been  told to stop the infusion you can remove the tubing.  Follow these steps:  o Wash your hands.  o Clamp the tubing (squeeze the white clamp until you hear or feel a click).  o Remove the clear dressing that covers the tubing.  o Grasp the tubing close to the skin and gently pull.  If you meet resistance, stop pulling and page or call your anesthesiologist.  o DO NOT cut or forcefully remove the tubing.  o After removal, check the end of the tubing for a dark tip.  If you do not see a dark tip, page or call your anesthesiologist.  o Apply firm pressure over the site until oozing stops.  Wash the area with soap and water, dry with a clean towel and then cover with a bandage.      The pump is not reusable. Dispose of it in the trash and wash your hands.   Troubleshooting    Tubing Comes Out From Skin:  If the tube accidentally comes out, check the end of the tube for a dark tip.    If you see a dark tip simply discard it and use the pain pills prescribed to you by your surgeon.    If you don t see a dark tip, page or call the anesthesiologist.    Tubing Disconnection:  If the tubing accidentally becomes disconnected from the pump, DO NOT reconnect the pump to the tubing.  It may have been contaminated with germs.  Close the tubing clamp and immediately page or call your anesthesiologist.    Fluid Leaking:  If enough fluid is leaking from the pump or the tubing outside your body to soak through the dressing, close the white clamp on the tubing and page or call your anesthesiologist.    Immediately report the following to your anesthesiologist:    Redness, warmth, swelling, or tenderness at the site the tubing was inserted    Increase in pain    Fever, chills, sweats    Bowel or bladder changes    Difficulty breathing    Dizziness, lightheadedness    Blurred vision    Ringing or buzzing in your ears    Metal taste in your mouth    Numbness and/or tingling around your mouth, fingers or  toes    Drowsiness    Confusion    Trouble removing the tubing    Dark tip is not present when tubing is removed         Notifying your Anesthesiologist  o Page:  Dial [phone], then enter [phone].  You will be prompted to enter your phone number and then the # sign.  The anesthesiologist will call you back.  o Call:  Dial [phone].  Ask to speak to the anesthesiologist on call for the [facility].

## 2020-08-03 NOTE — ANESTHESIA CARE TRANSFER NOTE
Patient: Amilcar Cope    Procedure(s):  bilateral simple mastectomy,  nipple grafts. OnQ    Diagnosis: Gender dysphoria in adolescent and adult [F64.0]  Diagnosis Additional Information: No value filed.    Anesthesia Type:   General     Note:  Airway :Face Mask  Patient transferred to:PACU  Handoff Report: Identifed the Patient, Identified the Reponsible Provider, Reviewed the pertinent medical history, Discussed the surgical course, Reviewed Intra-OP anesthesia mangement and issues during anesthesia, Set expectations for post-procedure period and Allowed opportunity for questions and acknowledgement of understanding      Vitals: (Last set prior to Anesthesia Care Transfer)    CRNA VITALS  8/3/2020 1127 - 8/3/2020 1207      8/3/2020             Pulse:  99    SpO2:  93 %                Electronically Signed By: TIN Wheeler CRNA  August 3, 2020  12:07 PM

## 2020-08-03 NOTE — ANESTHESIA POSTPROCEDURE EVALUATION
Anesthesia POST Procedure Evaluation    Patient: Amilcar Cope   MRN:     [id number] Gender:   adult   Age:    24 year old :      1995        Preoperative Diagnosis: Gender dysphoria in adolescent and adult [F64.0]   Procedure(s):  bilateral simple mastectomy,  nipple grafts. OnQ   Postop Comments: No value filed.     Anesthesia Type: General       Disposition: Outpatient   Postop Pain Control: Uneventful            Sign Out: Well controlled pain   PONV: No   Neuro/Psych: Uneventful            Sign Out: Acceptable/Baseline neuro status   Airway/Respiratory: Uneventful            Sign Out: Acceptable/Baseline resp. status   CV/Hemodynamics: Uneventful            Sign Out: Acceptable CV status   Other NRE: NONE   DID A NON-ROUTINE EVENT OCCUR? No         Last Anesthesia Record Vitals:  CRNA VITALS  8/3/2020 1127 - 8/3/2020 1227      8/3/2020             Pulse:  99    SpO2:  93 %          Last PACU Vitals:  Vitals Value Taken Time   /77 8/3/2020 12:45 PM   Temp 36.5  C (97.7  F) 8/3/2020 12:30 PM   Pulse 91 8/3/2020 12:45 PM   Resp 0 8/3/2020 12:50 PM   SpO2 96 % 8/3/2020 12:50 PM   Temp src     NIBP     Pulse     SpO2     Resp     Temp     Ht Rate     Temp 2     Vitals shown include unvalidated device data.      Electronically Signed By: Presley Armstrong MD, August 3, 2020, 12:51 PM

## 2020-08-06 LAB — COPATH REPORT: NORMAL

## 2020-08-06 NOTE — OP NOTE
Procedure Date: 08/03/2020      SURGEON:  Jenn Pratt MD      ASSISTANT:  Meena Choi PA-C (resident not available.  PAC did 50% of the case).      ASSISTANT:  Pierce Cole MS4.      PREOPERATIVE DIAGNOSIS:  Gender dysphoria.      POSTOPERATIVE DIAGNOSIS:  Gender dysphoria.      PROCEDURE:     1.  Bilateral simple mastectomies with nipple graft reconstruction.     2.  On-Q catheter placement.      ANESTHESIA:  GETA.      ESTIMATED BLOOD LOSS:  200 mL      INTRAVENOUS FLUIDS:  1300 mL      URINE OUTPUT:  250 mL      COUNTS:  Correct.      COMPLICATIONS:  None.      DRAINS:  ZULEIKA x2.      INDICATIONS:  This is a 24-year-old biological female transitioning to male who has a diagnosis of gender dysphoria.  They met WPATH and insurance criteria for gender affirming top surgery.  Due to the patient's large breast volume and ptosis, they would require double incision simple mastectomies and desired nipple graft reconstruction.      DESCRIPTION OF PROCEDURE:  The patient was seen in the preoperative waiting area.  The operative sites were marked.  This included the sternal notch, sternal midline, inframammary folds with extensions laterally for possible dog ears, transverse line from the mid humerus as well as transposed IMF onto the anterior breast mound as well as vertical line through the nipple-areolar complex.  Informed consent was obtained after reviewing the possible risks and complications including, but not limited to, the following:  Infection, bleeding, hematoma, seroma formation, poor healing, possible dehiscence, possible spitting sutures, possible hypertrophic scarring, possible injury surrounding neurovascular and musculoskeletal structures as well as intrathoracic and intraaxillary structures, altered sensation of the chest wall, either hypo or hypersensitivity, possible asymmetries and residual deformities, possible need for further surgery, possible anesthetic risks including possible DVT, PE  and cardiopulmonary arrest.  The patient was then brought to the operating room, placed supine on the OR table.  After general anesthesia was administered, Brito was placed.  The patient already had sequential compression devices on the lower extremities prior to induction.  Arms were secured to arm boards at 90 degrees from the table.  This was done with Ace wraps.  Additional pillows and padded safety straps were used for the thighs and forelegs.  Jeaneth Hugger was placed over the lower body.  The patient was put into a sitting position to check for symmetry and make adjustments as necessary.  Chest breast area was then prepped and draped in the usual sterile fashion using ChloraPrep.      After timeout was taken, the proper patient and procedure were identified.  We made our inframammary fold incisions using the scalpel and [facility] cautery on the right side and the PEAK PlasmaBlade on the left.  We left approximately 2 cm of subcutaneous fat along the IMF incision before beveling down to the pectoralis fascia.  Breast mound was elevated off the pectoral fascia up towards the clavicle, medially towards the parasternal border and laterally past the lateral border of the pec major muscle.  This allowed us to displace the breast mound inferiorly and eva where it overlapped with our inframammary fold incision.  This was a bit lower than our preoperative markings.  The nipple-areolar complex was then harvested sharply with a #10 blade and kept wrapped in normal saline gauze on the backtable, marked per side.  We then amputated the breast mounds in a similar fashion, taking care not to thin the superior skin flaps.  All tissue removed was weighed off the backtable and ultimately sent to pathology for histologic examination.        We then felt that there was a little bit of thinning of the superior skin flaps that need to be done to provide a nice flat, squared off upper chest contour.  This was done and it was obvious  that we needed to extend all our incisions laterally since this patient had a moderate lateral thoracic fullness.  Of note, we did not have to connect our incisions in the midline.  Once we had resected all of her tissue, the patient was temporarily skin stapled and put into a sitting position.  This allowed us to check for final symmetry and both contour and incisional symmetry were pretty close.  We then used a small 2 cm diameter template for nipple placement and localized the most aesthetic-appearing site for that.  It was approximately 12-13 cm from the midline for this very wide-chested individual and about 2.5 cm above the IMF incision.  With the patient in the supine position, the dissection pocket was irrigated with Ancef solution.  Hemostasis was achieved with cautery.        A #15 round ZULEIKA drain was introduced through a separate stab wound incision laterally and secured with 3-0 nylon suture.  This was draped along the inferior aspect of the dissection pocket.  Dual 10-inch On-Q catheters were percutaneously introduced from the epigastric region and draped along the superior aspect of the pocket.  These were secured with benzoin and Tegaderm.  Definitive closure was then achieved with 3-0 Vicryl deep dermal buried sutures followed by 4-0 Vicryl running subcuticular suture.        The nipple-areolar complex was thinned aggressively on the backtable and access nipple and/or areola was trimmed as needed.  Our recipient nipple graft sites were then deepithelialized sharply with a #15 blade.  Hemostasis was achieved with cautery and direct pressure.  The nipple graft was then anchored with 5-0 fast absorbing gut interrupted and running cuticular sutures.  Additional anchoring was done in a ok-cross fashion for the central nipple.  A #18-gauge needle was used to pie crust the graft.        Bolster dressings consisting of Xeroform sheets and antibiotic-soaked cotton balls was held in position with 2-0 silk  ties and skin staples.  Exofin and Dermabond product were applied to the incisions.  ZULEIKA bulbs were attached to the drain's tubing after shortening them.  ABD pads were used for drain sponges.  Kerlix rolls were unfurled across the anterior chest for padding.  A double long 6-inch Ace wrap was done for was wrapped circumferentially around the thorax for compression.  The patient had the Brito removed and was extubated, then was transferred to a stretcher and taken to the recovery room in satisfactory condition, having tolerated the procedure without difficulty or complication.        Final breast weights within the OR that were then sent to Pathology were right breast 1335 grams and left breast was 1164 grams.         CLAUDIA PEÑA MD             D: 2020   T: 2020   MT: SANYA      Name:     SID ROBERTO   MRN:      [id number]        Account:        [id number]   :      1995           Procedure Date: 2020      Document: [id number]

## 2020-08-11 ENCOUNTER — OFFICE VISIT (OUTPATIENT)
Dept: PLASTIC SURGERY | Facility: CLINIC | Age: 25
End: 2020-08-11
Payer: COMMERCIAL

## 2020-08-11 VITALS — WEIGHT: 240 LBS | BODY MASS INDEX: 38.57 KG/M2 | HEIGHT: 66 IN

## 2020-08-11 DIAGNOSIS — Z90.13 S/P BILATERAL MASTECTOMY: Primary | ICD-10-CM

## 2020-08-11 ASSESSMENT — PAIN SCALES - GENERAL: PAINLEVEL: MODERATE PAIN (4)

## 2020-08-11 ASSESSMENT — MIFFLIN-ST. JEOR: SCORE: 2026.38

## 2020-08-11 NOTE — NURSING NOTE
"Chief Complaint   Patient presents with     Surgical Followup     Pt here for post op       Vitals:    08/11/20 1208   Weight: 108.9 kg (240 lb)   Height: 1.676 m (5' 6\")       Body mass index is 38.74 kg/m .      JOSE CardozoT                      "

## 2020-08-11 NOTE — PROGRESS NOTES
"PLASTICS POST-OP   This is a 24 year old trans male with a history of gender dysphoria who presents today with his mother 8 days post-op after a bilateral simple mastectomy with nipple graft reconstruction on 8/3.    He states he is feeling \"pretty good\".  No emetic episodes. The scopolamine patch has been effective at minimizing nausea. He has been experiencing pruritus and Benadryl has helped with this. He denies constipation.  He is taking Tylenol for pain. His pain is under control. He states he could not tell when his OnQ ran out. He does not need a refill for pain medication. He requested two shorter ACE wraps instead of one large one.    PE: Chest: Nice chest contour. Good chest symmetry.  Some resolving ecchymosis along incisions. Minimal dog ears. Left flap is noticeably edematous today, may also be a bit thicker flap.  Nipple grafts 100% take. Dermabond intact.  Photos were taken with consent.       A&P: 24 year old trans male who presents 8 days post-op after a bilateral simple mastectomy with nipple grafts on 8/3.    As ZULEIKA drain output was within normal limits, ZULEIKA drains removed without difficulty. Also removed OnQ catheters. We discussed when to remove the Dermabond on the incision. He was given supplies for nipple graft dressings while they mature during the next week.    I reviewed with the patient how long to maintain limited activities. We discussed scar reducing techniques, such as Mederma, silicone strips, vitamin E oil, emu oil, massage and when he may begin using these. Problems to look out for during the recovery period were discussed, including: spitting sutures, incision dehiscence, hematoma/seroma, thick scarring, fluid accumulation under skin flap, delayed nipple graft healing.    He will follow up 2 months post-op. Causes for returning sooner were discussed.     This note was prepared on behalf of Jenn Pratt MD by Flaquita Clarke, a trained medical scribe, based on my observations " and the provider's statements to me.

## 2020-08-11 NOTE — LETTER
"8/11/2020       RE: Amilcar Cope  [address] [facility] [address]     Dear Colleague,    Thank you for referring your patient, Amilcar Cope, to the [facility] PLASTIC AND RECONSTRUCTIVE SURGERY at [facility]. Please see a copy of my visit note below.    PLASTICS POST-OP   This is a 24 year old trans male with a history of gender dysphoria who presents today with his mother 8 days post-op after a bilateral simple mastectomy with nipple graft reconstruction on 8/3.    He states he is feeling \"pretty good\".  No emetic episodes. The scopolamine patch has been effective at minimizing nausea. He has been experiencing pruritus and Benadryl has helped with this. He denies constipation.  He is taking Tylenol for pain. His pain is under control. He states he could not tell when his OnQ ran out. He does not need a refill for pain medication. He requested two shorter ACE wraps instead of one large one.    PE: Chest: Nice chest contour. Good chest symmetry.  Some resolving ecchymosis along incisions. Minimal dog ears. Left flap is noticeably edematous today, may also be a bit thicker flap.  Nipple grafts 100% take. Dermabond intact.  Photos were taken with consent.       A&P: 24 year old trans male who presents 8 days post-op after a bilateral simple mastectomy with nipple grafts on 8/3.    As ZULEIKA drain output was within normal limits, ZULEIKA drains removed without difficulty. Also removed OnQ catheters. We discussed when to remove the Dermabond on the incision. He was given supplies for nipple graft dressings while they mature during the next week.    I reviewed with the patient how long to maintain limited activities. We discussed scar reducing techniques, such as Mederma, silicone strips, vitamin E oil, emu oil, massage and when he may begin using these. Problems to look out for during the recovery period were discussed, including: spitting sutures, incision dehiscence, " hematoma/seroma, thick scarring, fluid accumulation under skin flap, delayed nipple graft healing.    He will follow up 2 months post-op. Causes for returning sooner were discussed.     This note was prepared on behalf of Jenn Pratt MD by Flaquita Clarke, a trained medical scribe, based on my observations and the provider's statements to me.     Again, thank you for allowing me to participate in the care of your patient.      Sincerely,    Jenn Pratt MD

## 2020-09-29 ENCOUNTER — OFFICE VISIT (OUTPATIENT)
Dept: PLASTIC SURGERY | Facility: CLINIC | Age: 25
End: 2020-09-29
Payer: COMMERCIAL

## 2020-09-29 VITALS — HEIGHT: 66 IN | WEIGHT: 240 LBS | BODY MASS INDEX: 38.57 KG/M2

## 2020-09-29 DIAGNOSIS — Z90.13 S/P BILATERAL MASTECTOMY: Primary | ICD-10-CM

## 2020-09-29 ASSESSMENT — MIFFLIN-ST. JEOR: SCORE: 2021.38

## 2020-09-29 ASSESSMENT — PAIN SCALES - GENERAL: PAINLEVEL: NO PAIN (0)

## 2020-09-29 NOTE — LETTER
"9/29/2020       RE: Amilcar Cope  [address] [facility] [address]     Dear Colleague,    Thank you for referring your patient, Amilcar Cope, to the [facility] PLASTIC AND RECONSTRUCTIVE SURGERY at [facility]. Please see a copy of my visit note below.    PLASTICS POST-OP  This is a 25 year old trans male with a history of gender dysphoria who presents 2 months post-op after a bilateral simple mastectomy with nipple graft conservation on 8/3.     Reports full range of motion.  Reports experiencing some lateral tightness, slightly more on the right side.  Reports some tenderness at drain sites. (ribs sometimes feel tender in certain positions)  Using \"Bio-oil\" on his incision scars, 1xQday for one month.   Wiley states he is content with how he looks.   Not currently working. He informed me he has submitted 30+ job applications . He is currently living with his parents.     PE: Chest: Nice upper chest contour. Good symmetry.   Nipple grafts lightly and irregularly pigmented.   Tiny bilateral \"dog-ears\"  Bilateral striae lateral to nipple graft.  Pimple on left medial side just above the suture line, reported to have some drainage.  Photos taken with verbal consent.     A&P: 25 year old trans male who presents 2 months post-op after a bilateral simple mastectomy with nipple graft conservation on 8/3.     We discussed scar reducing techniques, such as Mederma, silicone strips, vitamin E oil, emu oil, massage and when he may begin using these.     He will follow up 12 months post-op. Causes for returning sooner were discussed.     This note was prepared on behalf of Jenn Pratt MD by Ronel Cornelius, a trained medical scribe, based on my observations and the provider's statements to me.       Again, thank you for allowing me to participate in the care of your patient.  Sincerely,    Jenn Pratt MD  "

## 2020-09-29 NOTE — LETTER
"9/29/2020       RE: Amilcar Cope  [address] [facility] [address]     Dear Colleague,    Thank you for referring your patient, Amilcar Cope, to the [facility] PLASTIC AND RECONSTRUCTIVE SURGERY at [facility]. Please see a copy of my visit note below.    PLASTICS POST-OP  This is a 25 year old trans male with a history of gender dysphoria who presents 2 months post-op after a bilateral simple mastectomy with nipple graft conservation on 8/3.     Reports full range of motion.  Reports experiencing some lateral tightness, slightly more on the right side.  Reports some tenderness at drain sites. (ribs sometimes feel tender in certain positions)  Using \"Bio-oil\" on his incision scars, 1xQday for one month.   Wiley states he is content with how he looks.   Not currently working. He informed me he has submitted 30+ job applications . He is currently living with his parents.     PE: Chest: Nice upper chest contour. Good symmetry.   Nipple grafts lightly and irregularly pigmented.   Tiny bilateral \"dog-ears\"  Bilateral striae lateral to nipple graft.  Pimple on left medial side just above the suture line, reported to have some drainage.  Photos taken with verbal consent.     A&P: 25 year old trans male who presents 2 months post-op after a bilateral simple mastectomy with nipple graft conservation on 8/3.     We discussed scar reducing techniques, such as Mederma, silicone strips, vitamin E oil, emu oil, massage and when he may begin using these.     He will follow up 12 months post-op. Causes for returning sooner were discussed.     This note was prepared on behalf of Jenn Pratt MD by Ronel Cornelius, a trained medical scribe, based on my observations and the provider's statements to me.       Again, thank you for allowing me to participate in the care of your patient.      Sincerely,    Jenn Pratt MD      "

## 2020-09-29 NOTE — PROGRESS NOTES
"PLASTICS POST-OP  This is a 25 year old trans male with a history of gender dysphoria who presents 2 months post-op after a bilateral simple mastectomy with nipple graft conservation on 8/3.     Reports full range of motion.  Reports experiencing some lateral tightness, slightly more on the right side.  Reports some tenderness at drain sites. (ribs sometimes feel tender in certain positions)  Using \"Bio-oil\" on his incision scars, 1xQday for one month.   [facility] states he is content with how he looks.   Not currently working. He informed me he has submitted 30+ job applications . He is currently living with his parents.     PE: Chest: Nice upper chest contour. Good symmetry.   Nipple grafts lightly and irregularly pigmented.   Tiny bilateral \"dog-ears\"  Bilateral striae lateral to nipple graft.  Pimple on left medial side just above the suture line, reported to have some drainage.  Photos taken with verbal consent.     A&P: 25 year old trans male who presents 2 months post-op after a bilateral simple mastectomy with nipple graft conservation on 8/3.     We discussed scar reducing techniques, such as Mederma, silicone strips, vitamin E oil, emu oil, massage and when he may begin using these.     He will follow up 12 months post-op. Causes for returning sooner were discussed.     This note was prepared on behalf of Jenn Pratt MD by Ronel Cornelius, a trained medical scribe, based on my observations and the provider's statements to me.     "

## 2020-09-29 NOTE — NURSING NOTE
"Chief Complaint   Patient presents with     Surgical Followup     Pt here for 8 week post op       Vitals:    09/29/20 1233   Weight: 108.9 kg (240 lb)   Height: 1.676 m (5' 6\")       Body mass index is 38.74 kg/m .      JOSE CardozoT                    No vitals taken per provider  "

## 2021-01-03 ENCOUNTER — HEALTH MAINTENANCE LETTER (OUTPATIENT)
Age: 26
End: 2021-01-03

## 2021-04-25 ENCOUNTER — HEALTH MAINTENANCE LETTER (OUTPATIENT)
Age: 26
End: 2021-04-25

## 2021-10-10 ENCOUNTER — HEALTH MAINTENANCE LETTER (OUTPATIENT)
Age: 26
End: 2021-10-10

## 2022-05-21 ENCOUNTER — HEALTH MAINTENANCE LETTER (OUTPATIENT)
Age: 27
End: 2022-05-21

## 2022-09-18 ENCOUNTER — HEALTH MAINTENANCE LETTER (OUTPATIENT)
Age: 27
End: 2022-09-18

## 2023-06-04 ENCOUNTER — HEALTH MAINTENANCE LETTER (OUTPATIENT)
Age: 28
End: 2023-06-04

## (undated) DEVICE — ESU PENCIL W/SMOKE EVAC NEPTUNE STRYKER 0703-046-000

## (undated) DEVICE — SYR 10ML LL W/O NDL 302995

## (undated) DEVICE — BNDG ELASTIC 6" DBL LENGTH UNSTERILE 6611-16

## (undated) DEVICE — CATH TRAY FOLEY SURESTEP 16FR WDRAIN BAG STLK LATEX A300316A

## (undated) DEVICE — SU ETHILON 3-0 FS-1 18" 663G

## (undated) DEVICE — SU MONOCRYL 4-0 PS-2 18" UND Y496G

## (undated) DEVICE — Device

## (undated) DEVICE — CLOSURE SYS SKIN PREMIERPRO EXOFINFUSION 4X60CM 3473

## (undated) DEVICE — GLOVE PROTEXIS BLUE W/NEU-THERA 7.5  2D73EB75

## (undated) DEVICE — DAVINCI SI DRAPE ACCESSORY KIT 3-ARM 420290

## (undated) DEVICE — SU VICRYL 2-0 CT-1 27" UND J259H

## (undated) DEVICE — SU WND CLOSURE VLOC 180 ABS 2-0 12" GS-21 VLOCL0315

## (undated) DEVICE — LINEN TOWEL PACK X5 5464

## (undated) DEVICE — DAVINCI S MONOPOLAR SCISSORS PRECURVED HOT SHEARS 420179

## (undated) DEVICE — SUCTION MINISQUAIR SMOKE EVAC CAPTURE DEVICE SQ20012-01

## (undated) DEVICE — STABILIZER ARCH KOH-EFFICIENT 3.0CM KC-ARCH-30

## (undated) DEVICE — SOL ADH LIQUID BENZOIN SWAB 0.6ML C1544

## (undated) DEVICE — PAD CHUX UNDERPAD 30X36" P3036C

## (undated) DEVICE — WIPES FOLEY CARE SURESTEP PROVON DFC100

## (undated) DEVICE — LINEN GOWN X4 5410

## (undated) DEVICE — EYE MARKING PAD 581057

## (undated) DEVICE — SOL WATER IRRIG 1000ML BOTTLE 2F7114

## (undated) DEVICE — GLOVE PROTEXIS W/NEU-THERA 7.0  2D73TE70

## (undated) DEVICE — BARRIER INTERCEED 3X4" 4350

## (undated) DEVICE — POSITIONER ARMBOARD FOAM 1PAIR LF FP-ARMB1

## (undated) DEVICE — DAVINCI S CANNULA SEAL 8MM 400077

## (undated) DEVICE — BLADE KNIFE SURG 15 371115

## (undated) DEVICE — LINEN ORTHO PACK 5446

## (undated) DEVICE — LIGHT HANDLE X2

## (undated) DEVICE — DRAPE UNDER BUTTOCK 8483

## (undated) DEVICE — PAD PERI INDIV WRAP 11" 2022A

## (undated) DEVICE — DRAIN JACKSON PRATT RESERVOIR 100ML SU130-1305

## (undated) DEVICE — STPL SKIN 35W ROTATING HEAD PRW35

## (undated) DEVICE — PEN MARKING SKIN W/LABELS 31145918

## (undated) DEVICE — DRSG KERLIX 4 1/2"X4YDS ROLL 6730

## (undated) DEVICE — SOL NACL 0.9% INJ 1000ML BAG 2B1324X

## (undated) DEVICE — ESU GROUND PAD UNIVERSAL W/O CORD

## (undated) DEVICE — SUCTION MANIFOLD DORNOCH ULTRA CART UL-CL500

## (undated) DEVICE — DRSG COTTON BALL 6PK LCB62

## (undated) DEVICE — NDL INSUFFLATION 13GA 150MM C2202

## (undated) DEVICE — GOWN XLG DISP 9545

## (undated) DEVICE — ESU CORD BIPOLAR GREEN 10-4000

## (undated) DEVICE — CLOSURE SYS SKIN PREMIERPRO EXOFIN FUSION 4X22CM STRL 3472

## (undated) DEVICE — DAVINCI HOT SHEARS TIP COVER  400180

## (undated) DEVICE — DRSG ABDOMINAL 07 1/2X8" 7197D

## (undated) DEVICE — DRAPE LAP TRANSVERSE 29421

## (undated) DEVICE — DRSG XEROFORM 5X9" 8884431605

## (undated) DEVICE — DAVINCI S NDL DRIVER MEGA SUTURE CUT 420309

## (undated) DEVICE — SU VICRYL 4-0 PS-1 18" UND J682G

## (undated) DEVICE — SU VICRYL 3-0 FS-1 27" J442H

## (undated) DEVICE — PREP CHLORAPREP 26ML TINTED ORANGE  260815

## (undated) DEVICE — ADAPTER DRAPE ALLY AU-AD

## (undated) DEVICE — DAVINCI S FCP BIPOLAR FENESTRATED 420205

## (undated) DEVICE — DRAPE WARMER 66X44" ORS-300

## (undated) DEVICE — GLOVE PROTEXIS MICRO 6.5  2D73PM65

## (undated) DEVICE — DRSG TEGADERM 2 3/8X2 3/4" 1624W

## (undated) DEVICE — TUBING SUCTION MEDI-VAC 1/4"X20' N620A

## (undated) DEVICE — KIT POSITIONER PINK PAD XL ADVANCED 40588

## (undated) DEVICE — TUBING SMOKE EVAC PNEUVIEW 9660-XE

## (undated) DEVICE — ESU BLADE PEAK PLASMA 3.0S PS210-030S

## (undated) DEVICE — SYR BULB IRRIG 50ML LATEX FREE 0035280

## (undated) DEVICE — UTERINE MANIPULATOR RUMI 6.7MMX8CM UMB678

## (undated) DEVICE — SU PLAIN FAST ABSORB 5-0 PC-1 18" 1915G

## (undated) DEVICE — SYR 50ML LL W/O NDL 309653

## (undated) DEVICE — DAVINCI S NDL DRIVER LARGE 420006

## (undated) DEVICE — SOL NACL 0.9% IRRIG 1000ML BOTTLE 2F7124

## (undated) DEVICE — SPONGE LAP 18X18" X8435

## (undated) DEVICE — STRAP KNEE/BODY 31143004

## (undated) DEVICE — TUBING IRRIG CYSTO/BLADDER SET 81" LF 2C4040

## (undated) DEVICE — BNDG ELASTIC 6"X5YDS UNSTERILE 6611-60

## (undated) DEVICE — ENDO TROCAR FIRST ENTRY KII FIOS ADV FIX 05X100MM CFF03

## (undated) DEVICE — POSITIONER HEAD DONUT FOAM 9" LF FP-HEAD9

## (undated) DEVICE — BLADE KNIFE SURG 10 371110

## (undated) DEVICE — DAVINCI OBTURATOR 8MM BLADELESS 420023

## (undated) DEVICE — ADH SKIN CLOSURE PREMIERPRO EXOFIN 1.0ML 3470

## (undated) DEVICE — DAVINCI S DRAPE ARM INSTRUMENT 420015

## (undated) DEVICE — DAVINCI S CANNULA SEAL 8.5-13MM 420206

## (undated) DEVICE — SU SILK 2-0 TIE 12X30" A305H

## (undated) DEVICE — DRAIN JACKSON PRATT 15FR ROUND SU130-1323

## (undated) RX ORDER — HYDROMORPHONE HYDROCHLORIDE 1 MG/ML
INJECTION, SOLUTION INTRAMUSCULAR; INTRAVENOUS; SUBCUTANEOUS
Status: DISPENSED
Start: 2020-08-03

## (undated) RX ORDER — FENTANYL CITRATE 50 UG/ML
INJECTION, SOLUTION INTRAMUSCULAR; INTRAVENOUS
Status: DISPENSED
Start: 2020-08-03

## (undated) RX ORDER — BUPIVACAINE HYDROCHLORIDE 2.5 MG/ML
INJECTION, SOLUTION EPIDURAL; INFILTRATION; INTRACAUDAL
Status: DISPENSED
Start: 2020-08-03

## (undated) RX ORDER — ONDANSETRON 2 MG/ML
INJECTION INTRAMUSCULAR; INTRAVENOUS
Status: DISPENSED
Start: 2020-08-03

## (undated) RX ORDER — HYDROMORPHONE HYDROCHLORIDE 1 MG/ML
INJECTION, SOLUTION INTRAMUSCULAR; INTRAVENOUS; SUBCUTANEOUS
Status: DISPENSED
Start: 2019-09-27

## (undated) RX ORDER — ONDANSETRON 2 MG/ML
INJECTION INTRAMUSCULAR; INTRAVENOUS
Status: DISPENSED
Start: 2019-09-27

## (undated) RX ORDER — FENTANYL CITRATE 50 UG/ML
INJECTION, SOLUTION INTRAMUSCULAR; INTRAVENOUS
Status: DISPENSED
Start: 2019-09-27

## (undated) RX ORDER — ACETAMINOPHEN 325 MG/1
TABLET ORAL
Status: DISPENSED
Start: 2020-08-03

## (undated) RX ORDER — PHENYLEPHRINE HCL IN 0.9% NACL 1 MG/10 ML
SYRINGE (ML) INTRAVENOUS
Status: DISPENSED
Start: 2019-09-27

## (undated) RX ORDER — CEFAZOLIN SODIUM 1 G/3ML
INJECTION, POWDER, FOR SOLUTION INTRAMUSCULAR; INTRAVENOUS
Status: DISPENSED
Start: 2020-08-03

## (undated) RX ORDER — PHENAZOPYRIDINE HYDROCHLORIDE 200 MG/1
TABLET, FILM COATED ORAL
Status: DISPENSED
Start: 2019-09-27

## (undated) RX ORDER — PROPOFOL 10 MG/ML
INJECTION, EMULSION INTRAVENOUS
Status: DISPENSED
Start: 2019-09-27

## (undated) RX ORDER — CEFAZOLIN SODIUM 2 G/100ML
INJECTION, SOLUTION INTRAVENOUS
Status: DISPENSED
Start: 2019-09-27

## (undated) RX ORDER — DEXAMETHASONE SODIUM PHOSPHATE 4 MG/ML
INJECTION, SOLUTION INTRA-ARTICULAR; INTRALESIONAL; INTRAMUSCULAR; INTRAVENOUS; SOFT TISSUE
Status: DISPENSED
Start: 2019-09-27

## (undated) RX ORDER — CEFAZOLIN SODIUM 2 G/100ML
INJECTION, SOLUTION INTRAVENOUS
Status: DISPENSED
Start: 2020-08-03

## (undated) RX ORDER — EPHEDRINE SULFATE 50 MG/ML
INJECTION, SOLUTION INTRAMUSCULAR; INTRAVENOUS; SUBCUTANEOUS
Status: DISPENSED
Start: 2019-09-27

## (undated) RX ORDER — CEFAZOLIN SODIUM 1 G/3ML
INJECTION, POWDER, FOR SOLUTION INTRAMUSCULAR; INTRAVENOUS
Status: DISPENSED
Start: 2019-09-27

## (undated) RX ORDER — GABAPENTIN 300 MG/1
CAPSULE ORAL
Status: DISPENSED
Start: 2020-08-03

## (undated) RX ORDER — SCOLOPAMINE TRANSDERMAL SYSTEM 1 MG/1
PATCH, EXTENDED RELEASE TRANSDERMAL
Status: DISPENSED
Start: 2020-08-03

## (undated) RX ORDER — ONDANSETRON 4 MG/1
TABLET, ORALLY DISINTEGRATING ORAL
Status: DISPENSED
Start: 2019-09-27

## (undated) RX ORDER — LIDOCAINE HYDROCHLORIDE 20 MG/ML
INJECTION, SOLUTION EPIDURAL; INFILTRATION; INTRACAUDAL; PERINEURAL
Status: DISPENSED
Start: 2019-09-27